# Patient Record
Sex: FEMALE | Race: WHITE | NOT HISPANIC OR LATINO | Employment: OTHER | URBAN - METROPOLITAN AREA
[De-identification: names, ages, dates, MRNs, and addresses within clinical notes are randomized per-mention and may not be internally consistent; named-entity substitution may affect disease eponyms.]

---

## 2022-11-26 ENCOUNTER — HOSPITAL ENCOUNTER (INPATIENT)
Facility: HOSPITAL | Age: 82
LOS: 5 days | Discharge: NURSING, REHAB OR LONG-TERM CARE | End: 2022-12-01
Attending: EMERGENCY MEDICINE | Admitting: INTERNAL MEDICINE

## 2022-11-26 ENCOUNTER — APPOINTMENT (OUTPATIENT)
Dept: CT IMAGING | Facility: HOSPITAL | Age: 82
End: 2022-11-26

## 2022-11-26 ENCOUNTER — APPOINTMENT (OUTPATIENT)
Dept: GENERAL RADIOLOGY | Facility: HOSPITAL | Age: 82
End: 2022-11-26

## 2022-11-26 DIAGNOSIS — I63.9 ISCHEMIC STROKE: ICD-10-CM

## 2022-11-26 DIAGNOSIS — R47.81 SLURRED SPEECH: ICD-10-CM

## 2022-11-26 DIAGNOSIS — I49.5 SICK SINUS SYNDROME: ICD-10-CM

## 2022-11-26 DIAGNOSIS — R53.1 ACUTE LEFT-SIDED WEAKNESS: Primary | ICD-10-CM

## 2022-11-26 DIAGNOSIS — M15.9 PRIMARY OSTEOARTHRITIS INVOLVING MULTIPLE JOINTS: ICD-10-CM

## 2022-11-26 PROBLEM — M62.81 GENERALIZED MUSCLE WEAKNESS: Status: ACTIVE | Noted: 2022-03-18

## 2022-11-26 PROBLEM — I10 ESSENTIAL HYPERTENSION: Status: ACTIVE | Noted: 2022-11-26

## 2022-11-26 PROBLEM — M10.9 GOUT, UNSPECIFIED: Status: ACTIVE | Noted: 2022-03-18

## 2022-11-26 PROBLEM — M19.90 UNSPECIFIED OSTEOARTHRITIS, UNSPECIFIED SITE: Status: ACTIVE | Noted: 2022-03-18

## 2022-11-26 PROBLEM — F32.9 MAJOR DEPRESSIVE DISORDER, SINGLE EPISODE, UNSPECIFIED: Status: ACTIVE | Noted: 2022-03-18

## 2022-11-26 PROBLEM — L03.116 CELLULITIS OF LEFT LOWER LIMB: Status: ACTIVE | Noted: 2022-03-18

## 2022-11-26 PROBLEM — E78.5 HYPERLIPIDEMIA, UNSPECIFIED: Status: ACTIVE | Noted: 2022-03-18

## 2022-11-26 PROBLEM — Z48.812 ENCOUNTER FOR SURGICAL AFTERCARE FOLLOWING SURGERY ON THE CIRCULATORY SYSTEM: Status: ACTIVE | Noted: 2022-03-18

## 2022-11-26 PROBLEM — Z95.0 PRESENCE OF CARDIAC PACEMAKER: Status: ACTIVE | Noted: 2022-03-18

## 2022-11-26 PROBLEM — F41.9 ANXIETY DISORDER, UNSPECIFIED: Status: ACTIVE | Noted: 2022-03-18

## 2022-11-26 PROBLEM — R27.8 OTHER LACK OF COORDINATION: Status: ACTIVE | Noted: 2022-03-18

## 2022-11-26 LAB
ALBUMIN SERPL-MCNC: 4.6 G/DL (ref 3.5–5.2)
ALBUMIN/GLOB SERPL: 1.2 G/DL
ALP SERPL-CCNC: 110 U/L (ref 39–117)
ALT SERPL W P-5'-P-CCNC: 17 U/L (ref 1–33)
ANION GAP SERPL CALCULATED.3IONS-SCNC: 13 MMOL/L (ref 5–15)
APTT PPP: 25.2 SECONDS (ref 24–31)
AST SERPL-CCNC: 24 U/L (ref 1–32)
BACTERIA UR QL AUTO: ABNORMAL /HPF
BASOPHILS # BLD AUTO: 0 10*3/MM3 (ref 0–0.2)
BASOPHILS NFR BLD AUTO: 0.4 % (ref 0–1.5)
BILIRUB SERPL-MCNC: 0.5 MG/DL (ref 0–1.2)
BILIRUB UR QL STRIP: NEGATIVE
BUN SERPL-MCNC: 26 MG/DL (ref 8–23)
BUN/CREAT SERPL: 22 (ref 7–25)
CALCIUM SPEC-SCNC: 10.6 MG/DL (ref 8.6–10.5)
CHLORIDE SERPL-SCNC: 99 MMOL/L (ref 98–107)
CLARITY UR: CLEAR
CO2 SERPL-SCNC: 27 MMOL/L (ref 22–29)
COLOR UR: YELLOW
CREAT SERPL-MCNC: 1.18 MG/DL (ref 0.57–1)
DEPRECATED RDW RBC AUTO: 52.5 FL (ref 37–54)
EGFRCR SERPLBLD CKD-EPI 2021: 46.8 ML/MIN/1.73
EOSINOPHIL # BLD AUTO: 0 10*3/MM3 (ref 0–0.4)
EOSINOPHIL NFR BLD AUTO: 0.1 % (ref 0.3–6.2)
ERYTHROCYTE [DISTWIDTH] IN BLOOD BY AUTOMATED COUNT: 15.5 % (ref 12.3–15.4)
GLOBULIN UR ELPH-MCNC: 3.7 GM/DL
GLUCOSE BLDC GLUCOMTR-MCNC: 149 MG/DL (ref 70–105)
GLUCOSE SERPL-MCNC: 152 MG/DL (ref 65–99)
GLUCOSE UR STRIP-MCNC: NEGATIVE MG/DL
HCT VFR BLD AUTO: 45.1 % (ref 34–46.6)
HGB BLD-MCNC: 14.7 G/DL (ref 12–15.9)
HGB UR QL STRIP.AUTO: NEGATIVE
HOLD SPECIMEN: NORMAL
HOLD SPECIMEN: NORMAL
HYALINE CASTS UR QL AUTO: ABNORMAL /LPF
INR PPP: 1.04 (ref 0.93–1.1)
KETONES UR QL STRIP: NEGATIVE
LEUKOCYTE ESTERASE UR QL STRIP.AUTO: NEGATIVE
LYMPHOCYTES # BLD AUTO: 0.9 10*3/MM3 (ref 0.7–3.1)
LYMPHOCYTES NFR BLD AUTO: 8.2 % (ref 19.6–45.3)
MCH RBC QN AUTO: 31.8 PG (ref 26.6–33)
MCHC RBC AUTO-ENTMCNC: 32.5 G/DL (ref 31.5–35.7)
MCV RBC AUTO: 97.8 FL (ref 79–97)
MONOCYTES # BLD AUTO: 1 10*3/MM3 (ref 0.1–0.9)
MONOCYTES NFR BLD AUTO: 9.4 % (ref 5–12)
NEUTROPHILS NFR BLD AUTO: 8.9 10*3/MM3 (ref 1.7–7)
NEUTROPHILS NFR BLD AUTO: 81.9 % (ref 42.7–76)
NITRITE UR QL STRIP: NEGATIVE
NRBC BLD AUTO-RTO: 0 /100 WBC (ref 0–0.2)
PH UR STRIP.AUTO: <=5 [PH] (ref 5–8)
PLATELET # BLD AUTO: 194 10*3/MM3 (ref 140–450)
PMV BLD AUTO: 9.6 FL (ref 6–12)
POTASSIUM SERPL-SCNC: 4.2 MMOL/L (ref 3.5–5.2)
PROT SERPL-MCNC: 8.3 G/DL (ref 6–8.5)
PROT UR QL STRIP: ABNORMAL
PROTHROMBIN TIME: 10.7 SECONDS (ref 9.6–11.7)
RBC # BLD AUTO: 4.61 10*6/MM3 (ref 3.77–5.28)
RBC # UR STRIP: ABNORMAL /HPF
REF LAB TEST METHOD: ABNORMAL
SODIUM SERPL-SCNC: 139 MMOL/L (ref 136–145)
SP GR UR STRIP: 1.04 (ref 1–1.03)
SQUAMOUS #/AREA URNS HPF: ABNORMAL /HPF
TROPONIN T SERPL-MCNC: <0.01 NG/ML (ref 0–0.03)
UROBILINOGEN UR QL STRIP: ABNORMAL
WBC # UR STRIP: ABNORMAL /HPF
WBC NRBC COR # BLD: 10.9 10*3/MM3 (ref 3.4–10.8)
WHOLE BLOOD HOLD COAG: NORMAL
WHOLE BLOOD HOLD SPECIMEN: NORMAL

## 2022-11-26 PROCEDURE — 70450 CT HEAD/BRAIN W/O DYE: CPT

## 2022-11-26 PROCEDURE — 86900 BLOOD TYPING SEROLOGIC ABO: CPT | Performed by: EMERGENCY MEDICINE

## 2022-11-26 PROCEDURE — 99285 EMERGENCY DEPT VISIT HI MDM: CPT

## 2022-11-26 PROCEDURE — 70496 CT ANGIOGRAPHY HEAD: CPT

## 2022-11-26 PROCEDURE — 25010000002 ALTEPLASE PER 1 MG: Performed by: EMERGENCY MEDICINE

## 2022-11-26 PROCEDURE — 86901 BLOOD TYPING SEROLOGIC RH(D): CPT | Performed by: EMERGENCY MEDICINE

## 2022-11-26 PROCEDURE — 25010000002 ALTEPLASE PER 1 MG

## 2022-11-26 PROCEDURE — 81001 URINALYSIS AUTO W/SCOPE: CPT | Performed by: EMERGENCY MEDICINE

## 2022-11-26 PROCEDURE — 81003 URINALYSIS AUTO W/O SCOPE: CPT | Performed by: EMERGENCY MEDICINE

## 2022-11-26 PROCEDURE — 85730 THROMBOPLASTIN TIME PARTIAL: CPT | Performed by: EMERGENCY MEDICINE

## 2022-11-26 PROCEDURE — 86850 RBC ANTIBODY SCREEN: CPT | Performed by: EMERGENCY MEDICINE

## 2022-11-26 PROCEDURE — P9612 CATHETERIZE FOR URINE SPEC: HCPCS

## 2022-11-26 PROCEDURE — 71045 X-RAY EXAM CHEST 1 VIEW: CPT

## 2022-11-26 PROCEDURE — 74176 CT ABD & PELVIS W/O CONTRAST: CPT

## 2022-11-26 PROCEDURE — 85610 PROTHROMBIN TIME: CPT | Performed by: EMERGENCY MEDICINE

## 2022-11-26 PROCEDURE — 0042T HC CT CEREBRAL PERFUSION W/WO CONTRAST: CPT

## 2022-11-26 PROCEDURE — 0 IOPAMIDOL PER 1 ML: Performed by: EMERGENCY MEDICINE

## 2022-11-26 PROCEDURE — 3E03317 INTRODUCTION OF OTHER THROMBOLYTIC INTO PERIPHERAL VEIN, PERCUTANEOUS APPROACH: ICD-10-PCS | Performed by: EMERGENCY MEDICINE

## 2022-11-26 PROCEDURE — 84484 ASSAY OF TROPONIN QUANT: CPT | Performed by: EMERGENCY MEDICINE

## 2022-11-26 PROCEDURE — 36415 COLL VENOUS BLD VENIPUNCTURE: CPT | Performed by: EMERGENCY MEDICINE

## 2022-11-26 PROCEDURE — 25010000002 ONDANSETRON PER 1 MG: Performed by: EMERGENCY MEDICINE

## 2022-11-26 PROCEDURE — 93005 ELECTROCARDIOGRAM TRACING: CPT | Performed by: EMERGENCY MEDICINE

## 2022-11-26 PROCEDURE — 85025 COMPLETE CBC W/AUTO DIFF WBC: CPT | Performed by: EMERGENCY MEDICINE

## 2022-11-26 PROCEDURE — 70498 CT ANGIOGRAPHY NECK: CPT

## 2022-11-26 PROCEDURE — 82962 GLUCOSE BLOOD TEST: CPT

## 2022-11-26 PROCEDURE — 80053 COMPREHEN METABOLIC PANEL: CPT | Performed by: EMERGENCY MEDICINE

## 2022-11-26 RX ORDER — ONDANSETRON 2 MG/ML
8 INJECTION INTRAMUSCULAR; INTRAVENOUS ONCE
Status: COMPLETED | OUTPATIENT
Start: 2022-11-26 | End: 2022-11-26

## 2022-11-26 RX ORDER — SODIUM CHLORIDE 0.9 % (FLUSH) 0.9 %
10 SYRINGE (ML) INJECTION EVERY 12 HOURS SCHEDULED
Status: DISCONTINUED | OUTPATIENT
Start: 2022-11-26 | End: 2022-12-01 | Stop reason: HOSPADM

## 2022-11-26 RX ORDER — SODIUM CHLORIDE 9 MG/ML
0.81 INJECTION, SOLUTION INTRAVENOUS ONCE
Status: COMPLETED | OUTPATIENT
Start: 2022-11-26 | End: 2022-11-26

## 2022-11-26 RX ORDER — HYDRALAZINE HYDROCHLORIDE 20 MG/ML
10 INJECTION INTRAMUSCULAR; INTRAVENOUS EVERY 6 HOURS PRN
Status: DISCONTINUED | OUTPATIENT
Start: 2022-11-26 | End: 2022-12-01 | Stop reason: HOSPADM

## 2022-11-26 RX ORDER — SODIUM CHLORIDE 0.9 % (FLUSH) 0.9 %
10 SYRINGE (ML) INJECTION AS NEEDED
Status: DISCONTINUED | OUTPATIENT
Start: 2022-11-26 | End: 2022-12-01 | Stop reason: HOSPADM

## 2022-11-26 RX ORDER — SODIUM CHLORIDE 9 MG/ML
75 INJECTION, SOLUTION INTRAVENOUS CONTINUOUS
Status: DISCONTINUED | OUTPATIENT
Start: 2022-11-27 | End: 2022-11-28

## 2022-11-26 RX ORDER — ATORVASTATIN CALCIUM 40 MG/1
80 TABLET, FILM COATED ORAL NIGHTLY
Status: DISCONTINUED | OUTPATIENT
Start: 2022-11-26 | End: 2022-12-01 | Stop reason: HOSPADM

## 2022-11-26 RX ORDER — ASPIRIN 325 MG
325 TABLET ORAL DAILY
Status: DISCONTINUED | OUTPATIENT
Start: 2022-11-27 | End: 2022-12-01 | Stop reason: HOSPADM

## 2022-11-26 RX ORDER — SODIUM CHLORIDE 9 MG/ML
40 INJECTION, SOLUTION INTRAVENOUS AS NEEDED
Status: DISCONTINUED | OUTPATIENT
Start: 2022-11-26 | End: 2022-12-01 | Stop reason: HOSPADM

## 2022-11-26 RX ORDER — ASPIRIN 300 MG/1
300 SUPPOSITORY RECTAL DAILY
Status: DISCONTINUED | OUTPATIENT
Start: 2022-11-27 | End: 2022-12-01 | Stop reason: HOSPADM

## 2022-11-26 RX ADMIN — IOPAMIDOL 150 ML: 755 INJECTION, SOLUTION INTRAVENOUS at 20:55

## 2022-11-26 RX ADMIN — ALTEPLASE 51.8 MG: KIT at 21:04

## 2022-11-26 RX ADMIN — Medication 10 ML: at 23:48

## 2022-11-26 RX ADMIN — SODIUM CHLORIDE 500 ML: 9 INJECTION, SOLUTION INTRAVENOUS at 22:15

## 2022-11-26 RX ADMIN — SODIUM CHLORIDE 0.81 ML/KG/HR: 9 INJECTION, SOLUTION INTRAVENOUS at 21:49

## 2022-11-26 RX ADMIN — ONDANSETRON 8 MG: 2 INJECTION INTRAMUSCULAR; INTRAVENOUS at 22:05

## 2022-11-26 RX ADMIN — ALTEPLASE 5.8 MG: KIT at 21:01

## 2022-11-27 ENCOUNTER — APPOINTMENT (OUTPATIENT)
Dept: CT IMAGING | Facility: HOSPITAL | Age: 82
End: 2022-11-27

## 2022-11-27 LAB
CHOLEST SERPL-MCNC: 200 MG/DL (ref 0–200)
GLUCOSE BLDC GLUCOMTR-MCNC: 108 MG/DL (ref 70–105)
GLUCOSE BLDC GLUCOMTR-MCNC: 110 MG/DL (ref 70–105)
GLUCOSE BLDC GLUCOMTR-MCNC: 168 MG/DL (ref 70–105)
HDLC SERPL-MCNC: 51 MG/DL (ref 40–60)
LDLC SERPL CALC-MCNC: 133 MG/DL (ref 0–100)
LDLC/HDLC SERPL: 2.57 {RATIO}
T4 FREE SERPL-MCNC: 1.2 NG/DL (ref 0.93–1.7)
TRIGL SERPL-MCNC: 89 MG/DL (ref 0–150)
TSH SERPL DL<=0.05 MIU/L-ACNC: 2.85 UIU/ML (ref 0.27–4.2)
VLDLC SERPL-MCNC: 16 MG/DL (ref 5–40)

## 2022-11-27 PROCEDURE — 83036 HEMOGLOBIN GLYCOSYLATED A1C: CPT | Performed by: PSYCHIATRY & NEUROLOGY

## 2022-11-27 PROCEDURE — 99222 1ST HOSP IP/OBS MODERATE 55: CPT | Performed by: PSYCHIATRY & NEUROLOGY

## 2022-11-27 PROCEDURE — 84443 ASSAY THYROID STIM HORMONE: CPT | Performed by: PSYCHIATRY & NEUROLOGY

## 2022-11-27 PROCEDURE — 84439 ASSAY OF FREE THYROXINE: CPT | Performed by: PSYCHIATRY & NEUROLOGY

## 2022-11-27 PROCEDURE — 92610 EVALUATE SWALLOWING FUNCTION: CPT

## 2022-11-27 PROCEDURE — 82962 GLUCOSE BLOOD TEST: CPT

## 2022-11-27 PROCEDURE — 80061 LIPID PANEL: CPT | Performed by: PSYCHIATRY & NEUROLOGY

## 2022-11-27 PROCEDURE — 70450 CT HEAD/BRAIN W/O DYE: CPT

## 2022-11-27 PROCEDURE — 25010000002 HYDRALAZINE PER 20 MG: Performed by: NURSE PRACTITIONER

## 2022-11-27 RX ADMIN — Medication 10 ML: at 20:39

## 2022-11-27 RX ADMIN — ATORVASTATIN CALCIUM 80 MG: 40 TABLET, FILM COATED ORAL at 20:38

## 2022-11-27 RX ADMIN — Medication 10 ML: at 08:22

## 2022-11-27 RX ADMIN — SODIUM CHLORIDE 75 ML/HR: 9 INJECTION, SOLUTION INTRAVENOUS at 00:30

## 2022-11-27 RX ADMIN — SODIUM CHLORIDE 5 MG/HR: 9 INJECTION, SOLUTION INTRAVENOUS at 04:28

## 2022-11-27 RX ADMIN — HYDRALAZINE HYDROCHLORIDE 10 MG: 20 INJECTION INTRAMUSCULAR; INTRAVENOUS at 03:19

## 2022-11-27 RX ADMIN — ASPIRIN 325 MG ORAL TABLET 325 MG: 325 PILL ORAL at 22:01

## 2022-11-27 RX ADMIN — SODIUM CHLORIDE 75 ML/HR: 9 INJECTION, SOLUTION INTRAVENOUS at 17:45

## 2022-11-27 RX ADMIN — SODIUM CHLORIDE 5 MG/HR: 9 INJECTION, SOLUTION INTRAVENOUS at 20:38

## 2022-11-28 ENCOUNTER — APPOINTMENT (OUTPATIENT)
Dept: MRI IMAGING | Facility: HOSPITAL | Age: 82
End: 2022-11-28

## 2022-11-28 ENCOUNTER — APPOINTMENT (OUTPATIENT)
Dept: CARDIOLOGY | Facility: HOSPITAL | Age: 82
End: 2022-11-28

## 2022-11-28 LAB
ANION GAP SERPL CALCULATED.3IONS-SCNC: 12 MMOL/L (ref 5–15)
BASOPHILS # BLD AUTO: 0.1 10*3/MM3 (ref 0–0.2)
BASOPHILS NFR BLD AUTO: 0.4 % (ref 0–1.5)
BUN SERPL-MCNC: 27 MG/DL (ref 8–23)
BUN/CREAT SERPL: 23.9 (ref 7–25)
CALCIUM SPEC-SCNC: 9.1 MG/DL (ref 8.6–10.5)
CHLORIDE SERPL-SCNC: 109 MMOL/L (ref 98–107)
CO2 SERPL-SCNC: 24 MMOL/L (ref 22–29)
CREAT SERPL-MCNC: 1.13 MG/DL (ref 0.57–1)
DEPRECATED RDW RBC AUTO: 51.2 FL (ref 37–54)
EGFRCR SERPLBLD CKD-EPI 2021: 49.3 ML/MIN/1.73
EOSINOPHIL # BLD AUTO: 0 10*3/MM3 (ref 0–0.4)
EOSINOPHIL NFR BLD AUTO: 0.2 % (ref 0.3–6.2)
ERYTHROCYTE [DISTWIDTH] IN BLOOD BY AUTOMATED COUNT: 15.2 % (ref 12.3–15.4)
GLUCOSE SERPL-MCNC: 99 MG/DL (ref 65–99)
HBA1C MFR BLD: 5.3 % (ref 3.5–5.6)
HCT VFR BLD AUTO: 42.1 % (ref 34–46.6)
HGB BLD-MCNC: 13.4 G/DL (ref 12–15.9)
LYMPHOCYTES # BLD AUTO: 2.2 10*3/MM3 (ref 0.7–3.1)
LYMPHOCYTES NFR BLD AUTO: 17.2 % (ref 19.6–45.3)
MCH RBC QN AUTO: 31.6 PG (ref 26.6–33)
MCHC RBC AUTO-ENTMCNC: 31.9 G/DL (ref 31.5–35.7)
MCV RBC AUTO: 99.1 FL (ref 79–97)
MONOCYTES # BLD AUTO: 1.2 10*3/MM3 (ref 0.1–0.9)
MONOCYTES NFR BLD AUTO: 9.5 % (ref 5–12)
NEUTROPHILS NFR BLD AUTO: 72.7 % (ref 42.7–76)
NEUTROPHILS NFR BLD AUTO: 9.4 10*3/MM3 (ref 1.7–7)
NRBC BLD AUTO-RTO: 0.1 /100 WBC (ref 0–0.2)
PLATELET # BLD AUTO: 175 10*3/MM3 (ref 140–450)
PMV BLD AUTO: 9.1 FL (ref 6–12)
POTASSIUM SERPL-SCNC: 4.2 MMOL/L (ref 3.5–5.2)
RBC # BLD AUTO: 4.25 10*6/MM3 (ref 3.77–5.28)
SODIUM SERPL-SCNC: 145 MMOL/L (ref 136–145)
WBC NRBC COR # BLD: 13 10*3/MM3 (ref 3.4–10.8)

## 2022-11-28 PROCEDURE — 93306 TTE W/DOPPLER COMPLETE: CPT | Performed by: INTERNAL MEDICINE

## 2022-11-28 PROCEDURE — 25010000002 SULFUR HEXAFLUORIDE MICROSPH 60.7-25 MG RECONSTITUTED SUSPENSION: Performed by: INTERNAL MEDICINE

## 2022-11-28 PROCEDURE — 93306 TTE W/DOPPLER COMPLETE: CPT

## 2022-11-28 PROCEDURE — 80048 BASIC METABOLIC PNL TOTAL CA: CPT | Performed by: NURSE PRACTITIONER

## 2022-11-28 PROCEDURE — 97162 PT EVAL MOD COMPLEX 30 MIN: CPT

## 2022-11-28 PROCEDURE — 85025 COMPLETE CBC W/AUTO DIFF WBC: CPT | Performed by: NURSE PRACTITIONER

## 2022-11-28 PROCEDURE — 81403 MOPATH PROCEDURE LEVEL 4: CPT

## 2022-11-28 PROCEDURE — 97166 OT EVAL MOD COMPLEX 45 MIN: CPT | Performed by: OCCUPATIONAL THERAPIST

## 2022-11-28 PROCEDURE — 97535 SELF CARE MNGMENT TRAINING: CPT | Performed by: OCCUPATIONAL THERAPIST

## 2022-11-28 PROCEDURE — 97116 GAIT TRAINING THERAPY: CPT

## 2022-11-28 RX ADMIN — ATORVASTATIN CALCIUM 80 MG: 40 TABLET, FILM COATED ORAL at 20:21

## 2022-11-28 RX ADMIN — SULFUR HEXAFLUORIDE 5 ML: KIT at 12:15

## 2022-11-28 RX ADMIN — SODIUM CHLORIDE 75 ML/HR: 9 INJECTION, SOLUTION INTRAVENOUS at 04:05

## 2022-11-28 RX ADMIN — Medication 10 ML: at 08:25

## 2022-11-28 RX ADMIN — Medication 10 ML: at 20:21

## 2022-11-29 ENCOUNTER — APPOINTMENT (OUTPATIENT)
Dept: MRI IMAGING | Facility: HOSPITAL | Age: 82
End: 2022-11-29

## 2022-11-29 PROBLEM — R53.81 PHYSICAL DEBILITY: Status: ACTIVE | Noted: 2022-11-29

## 2022-11-29 PROBLEM — E78.2 MIXED HYPERLIPIDEMIA: Status: ACTIVE | Noted: 2022-03-18

## 2022-11-29 PROBLEM — N17.9 ACUTE KIDNEY INJURY: Status: ACTIVE | Noted: 2022-11-29

## 2022-11-29 PROBLEM — I48.91 ATRIAL FIBRILLATION: Status: ACTIVE | Noted: 2022-11-29

## 2022-11-29 LAB
BH CV ECHO MEAS - ACS: 1.63 CM
BH CV ECHO MEAS - AO MAX PG: 8.4 MMHG
BH CV ECHO MEAS - AO MEAN PG: 4.4 MMHG
BH CV ECHO MEAS - AO ROOT DIAM: 2.8 CM
BH CV ECHO MEAS - AO V2 MAX: 144.8 CM/SEC
BH CV ECHO MEAS - AO V2 VTI: 35.4 CM
BH CV ECHO MEAS - AVA(I,D): 1.66 CM2
BH CV ECHO MEAS - EDV(CUBED): 69.3 ML
BH CV ECHO MEAS - EDV(MOD-SP4): 78.9 ML
BH CV ECHO MEAS - EF(MOD-SP4): 61 %
BH CV ECHO MEAS - ESV(CUBED): 22.6 ML
BH CV ECHO MEAS - ESV(MOD-SP4): 30.8 ML
BH CV ECHO MEAS - FS: 31.2 %
BH CV ECHO MEAS - IVS/LVPW: 1.19 CM
BH CV ECHO MEAS - IVSD: 1.01 CM
BH CV ECHO MEAS - LA DIMENSION: 3.1 CM
BH CV ECHO MEAS - LV DIASTOLIC VOL/BSA (35-75): 49.1 CM2
BH CV ECHO MEAS - LV MASS(C)D: 118.7 GRAMS
BH CV ECHO MEAS - LV MAX PG: 5.4 MMHG
BH CV ECHO MEAS - LV MEAN PG: 2.6 MMHG
BH CV ECHO MEAS - LV SYSTOLIC VOL/BSA (12-30): 19.2 CM2
BH CV ECHO MEAS - LV V1 MAX: 115.8 CM/SEC
BH CV ECHO MEAS - LV V1 VTI: 20.8 CM
BH CV ECHO MEAS - LVIDD: 4.1 CM
BH CV ECHO MEAS - LVIDS: 2.8 CM
BH CV ECHO MEAS - LVOT AREA: 2.8 CM2
BH CV ECHO MEAS - LVOT DIAM: 1.89 CM
BH CV ECHO MEAS - LVPWD: 0.84 CM
BH CV ECHO MEAS - MV A MAX VEL: 94.4 CM/SEC
BH CV ECHO MEAS - MV DEC SLOPE: 193 CM/SEC2
BH CV ECHO MEAS - MV DEC TIME: 0.31 MSEC
BH CV ECHO MEAS - MV E MAX VEL: 58.9 CM/SEC
BH CV ECHO MEAS - MV E/A: 0.62
BH CV ECHO MEAS - MV MAX PG: 4.4 MMHG
BH CV ECHO MEAS - MV MEAN PG: 1.26 MMHG
BH CV ECHO MEAS - MV V2 VTI: 24.2 CM
BH CV ECHO MEAS - MVA(VTI): 2.42 CM2
BH CV ECHO MEAS - PA ACC TIME: 0.08 SEC
BH CV ECHO MEAS - PA PR(ACCEL): 44.7 MMHG
BH CV ECHO MEAS - PA V2 MAX: 110.4 CM/SEC
BH CV ECHO MEAS - RAP SYSTOLE: 3 MMHG
BH CV ECHO MEAS - RV MAX PG: 2.18 MMHG
BH CV ECHO MEAS - RV V1 MAX: 73.8 CM/SEC
BH CV ECHO MEAS - RV V1 VTI: 12.5 CM
BH CV ECHO MEAS - RVDD: 2.3 CM
BH CV ECHO MEAS - RVSP: 36.1 MMHG
BH CV ECHO MEAS - SI(MOD-SP4): 29.9 ML/M2
BH CV ECHO MEAS - SV(LVOT): 58.7 ML
BH CV ECHO MEAS - SV(MOD-SP4): 48.1 ML
BH CV ECHO MEAS - TR MAX PG: 33.1 MMHG
BH CV ECHO MEAS - TR MAX VEL: 287.4 CM/SEC
BH CV ECHO SHUNT ASSESSMENT PERFORMED (HIDDEN SCRIPTING): 1
MAXIMAL PREDICTED HEART RATE: 140 BPM
STRESS TARGET HR: 119 BPM

## 2022-11-29 PROCEDURE — 70551 MRI BRAIN STEM W/O DYE: CPT

## 2022-11-29 PROCEDURE — 97530 THERAPEUTIC ACTIVITIES: CPT

## 2022-11-29 PROCEDURE — 97116 GAIT TRAINING THERAPY: CPT

## 2022-11-29 PROCEDURE — 92526 ORAL FUNCTION THERAPY: CPT

## 2022-11-29 RX ADMIN — Medication 10 ML: at 21:47

## 2022-11-29 RX ADMIN — ASPIRIN 325 MG ORAL TABLET 325 MG: 325 PILL ORAL at 08:04

## 2022-11-29 RX ADMIN — Medication 10 ML: at 08:04

## 2022-11-29 RX ADMIN — ATORVASTATIN CALCIUM 80 MG: 40 TABLET, FILM COATED ORAL at 21:46

## 2022-11-30 ENCOUNTER — PREP FOR SURGERY (OUTPATIENT)
Dept: OTHER | Facility: HOSPITAL | Age: 82
End: 2022-11-30

## 2022-11-30 ENCOUNTER — APPOINTMENT (OUTPATIENT)
Dept: GENERAL RADIOLOGY | Facility: HOSPITAL | Age: 82
End: 2022-11-30

## 2022-11-30 DIAGNOSIS — I49.5 SICK SINUS SYNDROME: Primary | ICD-10-CM

## 2022-11-30 PROCEDURE — 99153 MOD SED SAME PHYS/QHP EA: CPT | Performed by: INTERNAL MEDICINE

## 2022-11-30 PROCEDURE — 0JH606Z INSERTION OF PACEMAKER, DUAL CHAMBER INTO CHEST SUBCUTANEOUS TISSUE AND FASCIA, OPEN APPROACH: ICD-10-PCS | Performed by: INTERNAL MEDICINE

## 2022-11-30 PROCEDURE — C1898 LEAD, PMKR, OTHER THAN TRANS: HCPCS | Performed by: INTERNAL MEDICINE

## 2022-11-30 PROCEDURE — 0 IOPAMIDOL PER 1 ML: Performed by: INTERNAL MEDICINE

## 2022-11-30 PROCEDURE — C1889 IMPLANT/INSERT DEVICE, NOC: HCPCS | Performed by: INTERNAL MEDICINE

## 2022-11-30 PROCEDURE — 25010000002 MIDAZOLAM PER 1 MG: Performed by: INTERNAL MEDICINE

## 2022-11-30 PROCEDURE — 99222 1ST HOSP IP/OBS MODERATE 55: CPT | Performed by: INTERNAL MEDICINE

## 2022-11-30 PROCEDURE — 99152 MOD SED SAME PHYS/QHP 5/>YRS: CPT | Performed by: INTERNAL MEDICINE

## 2022-11-30 PROCEDURE — 02H63JZ INSERTION OF PACEMAKER LEAD INTO RIGHT ATRIUM, PERCUTANEOUS APPROACH: ICD-10-PCS | Performed by: INTERNAL MEDICINE

## 2022-11-30 PROCEDURE — 0JPT0PZ REMOVAL OF CARDIAC RHYTHM RELATED DEVICE FROM TRUNK SUBCUTANEOUS TISSUE AND FASCIA, OPEN APPROACH: ICD-10-PCS | Performed by: INTERNAL MEDICINE

## 2022-11-30 PROCEDURE — C1894 INTRO/SHEATH, NON-LASER: HCPCS | Performed by: INTERNAL MEDICINE

## 2022-11-30 PROCEDURE — 71045 X-RAY EXAM CHEST 1 VIEW: CPT

## 2022-11-30 PROCEDURE — 25010000002 FENTANYL CITRATE (PF) 50 MCG/ML SOLUTION: Performed by: INTERNAL MEDICINE

## 2022-11-30 PROCEDURE — 33214 UPGRADE OF PACEMAKER SYSTEM: CPT | Performed by: INTERNAL MEDICINE

## 2022-11-30 PROCEDURE — 25010000002 FENTANYL CITRATE (PF) 100 MCG/2ML SOLUTION: Performed by: INTERNAL MEDICINE

## 2022-11-30 PROCEDURE — 25010000002 CEFAZOLIN PER 500 MG: Performed by: INTERNAL MEDICINE

## 2022-11-30 PROCEDURE — C1785 PMKR, DUAL, RATE-RESP: HCPCS | Performed by: INTERNAL MEDICINE

## 2022-11-30 DEVICE — LD PM SOLIA S 45: Type: IMPLANTABLE DEVICE | Site: CHEST | Status: FUNCTIONAL

## 2022-11-30 DEVICE — IMPLANTABLE DEVICE
Type: IMPLANTABLE DEVICE | Site: CHEST | Status: FUNCTIONAL
Brand: EDORA 8 DR-T

## 2022-11-30 DEVICE — ENV PM AIGISRX ANTIBAC RESORB 2.5X2.7IN MD: Type: IMPLANTABLE DEVICE | Site: CHEST | Status: FUNCTIONAL

## 2022-11-30 RX ORDER — FENTANYL CITRATE 50 UG/ML
INJECTION, SOLUTION INTRAMUSCULAR; INTRAVENOUS
Status: DISCONTINUED | OUTPATIENT
Start: 2022-11-30 | End: 2022-11-30 | Stop reason: HOSPADM

## 2022-11-30 RX ORDER — HYDROCODONE BITARTRATE AND ACETAMINOPHEN 5; 325 MG/1; MG/1
1 TABLET ORAL EVERY 4 HOURS PRN
Status: DISCONTINUED | OUTPATIENT
Start: 2022-11-30 | End: 2022-12-01 | Stop reason: HOSPADM

## 2022-11-30 RX ORDER — MIDAZOLAM HYDROCHLORIDE 1 MG/ML
INJECTION INTRAMUSCULAR; INTRAVENOUS
Status: DISCONTINUED | OUTPATIENT
Start: 2022-11-30 | End: 2022-12-01 | Stop reason: HOSPADM

## 2022-11-30 RX ORDER — MIDAZOLAM HYDROCHLORIDE 1 MG/ML
INJECTION INTRAMUSCULAR; INTRAVENOUS
Status: DISCONTINUED | OUTPATIENT
Start: 2022-11-30 | End: 2022-11-30 | Stop reason: HOSPADM

## 2022-11-30 RX ORDER — LIDOCAINE HYDROCHLORIDE AND EPINEPHRINE BITARTRATE 20; .01 MG/ML; MG/ML
INJECTION, SOLUTION SUBCUTANEOUS
Status: DISCONTINUED | OUTPATIENT
Start: 2022-11-30 | End: 2022-11-30 | Stop reason: HOSPADM

## 2022-11-30 RX ADMIN — CEFAZOLIN 2 G: 2 INJECTION, POWDER, FOR SOLUTION INTRAMUSCULAR; INTRAVENOUS at 14:59

## 2022-11-30 RX ADMIN — HYDROCODONE BITARTRATE AND ACETAMINOPHEN 1 TABLET: 5; 325 TABLET ORAL at 22:35

## 2022-11-30 RX ADMIN — Medication 10 ML: at 20:28

## 2022-11-30 RX ADMIN — ATORVASTATIN CALCIUM 80 MG: 40 TABLET, FILM COATED ORAL at 20:28

## 2022-11-30 RX ADMIN — ASPIRIN 325 MG ORAL TABLET 325 MG: 325 PILL ORAL at 08:00

## 2022-11-30 RX ADMIN — Medication 10 ML: at 08:00

## 2022-11-30 RX ADMIN — CEFAZOLIN 2 G: 2 INJECTION, POWDER, FOR SOLUTION INTRAMUSCULAR; INTRAVENOUS at 22:35

## 2022-12-01 ENCOUNTER — READMISSION MANAGEMENT (OUTPATIENT)
Dept: CALL CENTER | Facility: HOSPITAL | Age: 82
End: 2022-12-01

## 2022-12-01 VITALS
WEIGHT: 149.91 LBS | OXYGEN SATURATION: 94 % | RESPIRATION RATE: 20 BRPM | HEART RATE: 60 BPM | SYSTOLIC BLOOD PRESSURE: 155 MMHG | HEIGHT: 62 IN | DIASTOLIC BLOOD PRESSURE: 69 MMHG | TEMPERATURE: 98 F | BODY MASS INDEX: 27.59 KG/M2

## 2022-12-01 PROBLEM — G45.9 TRANSIENT ISCHEMIC ATTACK (TIA): Status: ACTIVE | Noted: 2022-12-01

## 2022-12-01 PROBLEM — I47.1 NARROW COMPLEX TACHYCARDIA: Status: ACTIVE | Noted: 2022-12-01

## 2022-12-01 PROBLEM — I47.19 NARROW COMPLEX TACHYCARDIA: Status: ACTIVE | Noted: 2022-12-01

## 2022-12-01 LAB
ABO GROUP BLD: NORMAL
BLD GP AB SCN SERPL QL: NEGATIVE
RH BLD: POSITIVE
T&S EXPIRATION DATE: NORMAL

## 2022-12-01 PROCEDURE — 97168 OT RE-EVAL EST PLAN CARE: CPT

## 2022-12-01 PROCEDURE — 92526 ORAL FUNCTION THERAPY: CPT

## 2022-12-01 PROCEDURE — 86901 BLOOD TYPING SEROLOGIC RH(D): CPT

## 2022-12-01 PROCEDURE — 93005 ELECTROCARDIOGRAM TRACING: CPT | Performed by: INTERNAL MEDICINE

## 2022-12-01 PROCEDURE — 93010 ELECTROCARDIOGRAM REPORT: CPT | Performed by: INTERNAL MEDICINE

## 2022-12-01 PROCEDURE — 99024 POSTOP FOLLOW-UP VISIT: CPT | Performed by: INTERNAL MEDICINE

## 2022-12-01 PROCEDURE — 97535 SELF CARE MNGMENT TRAINING: CPT

## 2022-12-01 PROCEDURE — 97164 PT RE-EVAL EST PLAN CARE: CPT

## 2022-12-01 PROCEDURE — 25010000002 CEFAZOLIN PER 500 MG: Performed by: INTERNAL MEDICINE

## 2022-12-01 PROCEDURE — 86900 BLOOD TYPING SEROLOGIC ABO: CPT

## 2022-12-01 PROCEDURE — 97116 GAIT TRAINING THERAPY: CPT

## 2022-12-01 RX ORDER — OXYCODONE HYDROCHLORIDE 5 MG/1
5 TABLET ORAL EVERY 8 HOURS PRN
Qty: 12 TABLET | Refills: 0 | Status: SHIPPED | OUTPATIENT
Start: 2022-12-01 | End: 2022-12-05

## 2022-12-01 RX ORDER — ATORVASTATIN CALCIUM 80 MG/1
80 TABLET, FILM COATED ORAL NIGHTLY
Qty: 30 TABLET | Refills: 2 | Status: SHIPPED | OUTPATIENT
Start: 2022-12-01 | End: 2023-03-01

## 2022-12-01 RX ORDER — ASPIRIN 81 MG/1
81 TABLET ORAL DAILY
Qty: 30 TABLET | Refills: 2 | Status: SHIPPED | OUTPATIENT
Start: 2022-12-01 | End: 2023-03-01

## 2022-12-01 RX ADMIN — Medication 10 ML: at 08:33

## 2022-12-01 RX ADMIN — ASPIRIN 325 MG ORAL TABLET 325 MG: 325 PILL ORAL at 08:33

## 2022-12-01 RX ADMIN — CEFAZOLIN 2 G: 2 INJECTION, POWDER, FOR SOLUTION INTRAMUSCULAR; INTRAVENOUS at 08:32

## 2022-12-01 NOTE — NURSING NOTE
Report called to [facility], spoke with Tracy and report given. Left unit per w/c to meet   in [address]. IV removed w/o difficuty or bleeding.

## 2022-12-01 NOTE — DISCHARGE SUMMARY
[facility] Medicine Services  DISCHARGE SUMMARY    Patient Name: Stephanie Sol  : 1940  MRN: [id number]    Date of Admission: 2022  Discharge Diagnosis: TIA/CRISTIAN/narrow complex tachycardia.  Date of Discharge:   Primary Care Physician: Lior Oscar MD      Presenting Problem:   Slurred speech [R47.81]  Acute left-sided weakness [R53.1]  Ischemic stroke (HCC) [I63.9]    Active and Resolved Hospital Problems:  Active Hospital Problems    Diagnosis POA   • **Acute left-sided weakness [R53.1] Yes     Priority: High   • Narrow complex tachycardia (HCC) [I47.1] Yes     Priority: High   • Acute kidney injury (HCC) [N17.9] Yes     Priority: High   • Physical debility [R53.81] Yes     Priority: Low   • Primary hypertension [I10] Yes   • Anxiety disorder, unspecified [F41.9] Yes   • Gout, unspecified [M10.9] Yes   • Mixed hyperlipidemia [E78.2] Yes   • Major depressive disorder, single episode, unspecified [F32.9] Yes   • Presence of cardiac pacemaker [Z95.0] Yes   • Generalized muscle weakness [M62.81] Yes   • Sick sinus syndrome (HCC) [I49.5] Yes      Resolved Hospital Problems   No resolved problems to display.         Hospital Course   From previous notes and with minor updates.      Hospital Course:    Patient is an 82 y.o. female who presented to the emergency department on 2022 with complaints of sudden left-sided weakness.  Neurology was consulted emergently and she was deemed appropriate for tPA intervention.  She was then taken to the ICU for close monitoring.  CT head, CTA head and neck and CT perfusion study per radiology showed no acute findings.  She passed her dysphagia screening and is tolerating p.o. meds.  She is awake and alert and answers appropriately.  No slurred speech, facial droop.  NIH 0.  She reports she is on some medication for gout and hypertension but is unaware of the meds she is on.  She has no current complaints.  Per review of  records apparently she had some new onset atrial fibrillation upon admission.  Her rhythm currently is sinus rhythm.  Creatinine was initially 1.18 with a baseline.  Labs have been reordered in the a.m.  She has a past medical history of sick sinus syndrome with a pacemaker in situ.  She denies any chest pain or shortness of air.  She is currently stable post tPA administration and has been downgraded from critical care status.  We have been consulted for medical management.  Patient was diagnosed with a narrow complex tachycardia and not atrial fibrillation.  Cardiology consult was completed.  MRI did not show any evidence of intracranial process.  Neurology consult was completed.  Patient possibly had a TIA.    TIA was treated with aspirin.  Acute kidney injury was treated with IV fluids.  Hyperlipidemia was treated with Lipitor.  Physical debility was treated with physical therapy.  Patient reported complete resolution of her symptoms after over 5 days in the hospital and requested to be discharged home.  Patient was advised to take her medications as prescribed.  The discharge medications are as per medication reconciliation list.  Patient was advised to follow-up with her primary care physician within 3 to 5 days of discharge.  Patient was advised to follow-up with her cardiologist within 14 days of discharge.  Patient was advised to follow-up with her neurologist within 14 days of discharge.  Patient was advised to return to the emergency department if she experiences any recurrence of her symptoms.  Patient and family agreed with the plan and patient was discharged in stable condition.          DISCHARGE Follow Up Recommendations for labs and diagnostics:     Patient was advised to follow-up with her primary care physician who will review her current medications.     Patient was advised to follow-up with her neurologist who will reassess her neuro function.     Patient was advised to follow-up with her  cardiologist who will reassess her cardiac function.     Patient was advised to return to the emergency department if she experiences any recurrence of her symptoms.                 Reasons For Change In Medications and Indications for New Medications:      Day of Discharge     Vital Signs:  Temp:  [97.8 °F (36.6 °C)-98.6 °F (37 °C)] 98.4 °F (36.9 °C)  Heart Rate:  [52-71] 60  Resp:  [11-20] 11  BP: ()/(48-95) 149/88    Physical Exam:  Physical Exam  Vitals reviewed.   Constitutional:       General: She is not in acute distress.  HENT:      Head: Normocephalic and atraumatic.      Nose: Nose normal. No congestion or rhinorrhea.      Mouth/Throat:      Mouth: Mucous membranes are moist.      Pharynx: Oropharynx is clear. No oropharyngeal exudate or posterior oropharyngeal erythema.   Eyes:      Pupils: Pupils are equal, round, and reactive to light.   Cardiovascular:      Pulses: Normal pulses.      Heart sounds: Normal heart sounds. No murmur heard.    No friction rub. No gallop.      Comments: S1 and S2 present.  No tachycardia.  Pulmonary:      Effort: No respiratory distress.      Breath sounds: No wheezing, rhonchi or rales.   Chest:      Chest wall: No tenderness.   Abdominal:      General: Abdomen is flat. Bowel sounds are normal. There is no distension.      Palpations: Abdomen is soft. There is no mass.      Tenderness: There is no abdominal tenderness. There is no right CVA tenderness, left CVA tenderness, guarding or rebound.      Hernia: No hernia is present.   Musculoskeletal:         General: No swelling, tenderness, deformity or signs of injury.      Cervical back: Neck supple. No tenderness.      Right lower leg: No edema.      Left lower leg: No edema.   Skin:     Capillary Refill: Capillary refill takes less than 2 seconds.      Coloration: Skin is not jaundiced.      Findings: No bruising, lesion or rash.   Neurological:      Mental Status: She is alert.      Comments: No facial asymmetry  noted.  Gait and station not tested.   Psychiatric:      Comments: No agitation.              Pertinent  and/or Most Recent Results     LAB RESULTS:      Lab 11/28/22  0601 11/26/22 2039   WBC 13.00* 10.90*   HEMOGLOBIN 13.4 14.7   HEMATOCRIT 42.1 45.1   PLATELETS 175 194   NEUTROS ABS 9.40* 8.90*   LYMPHS ABS 2.20 0.90   MONOS ABS 1.20* 1.00*   EOS ABS 0.00 0.00   MCV 99.1* 97.8*   PROTIME  --  10.7   APTT  --  25.2         Lab 11/28/22  0601 11/27/22  1559 11/26/22 2039   SODIUM 145  --  139   POTASSIUM 4.2  --  4.2   CHLORIDE 109*  --  99   CO2 24.0  --  27.0   ANION GAP 12.0  --  13.0   BUN 27*  --  26*   CREATININE 1.13*  --  1.18*   EGFR 49.3*  --  46.8*   GLUCOSE 99  --  152*   CALCIUM 9.1  --  10.6*   HEMOGLOBIN A1C  --  5.3  --    TSH  --  2.850  --          Lab 11/26/22 2039   TOTAL PROTEIN 8.3   ALBUMIN 4.60   GLOBULIN 3.7   ALT (SGPT) 17   AST (SGOT) 24   BILIRUBIN 0.5   ALK PHOS 110         Lab 11/26/22 2039   TROPONIN T <0.010   PROTIME 10.7   INR 1.04         Lab 11/27/22  1559   CHOLESTEROL 200   LDL CHOL 133*   HDL CHOL 51   TRIGLYCERIDES 89         Lab 11/26/22 2039   ANTIBODY SCREEN Negative         Brief Urine Lab Results  (Last result in the past 365 days)      Color   Clarity   Blood   Leuk Est   Nitrite   Protein   CREAT   Urine HCG        11/26/22 2108 Yellow   Clear   Negative   Negative   Negative   30 mg/dL (1+)               Microbiology Results (last 10 days)     ** No results found for the last 240 hours. **          CT Abdomen Pelvis Without Contrast    Result Date: 11/26/2022  Impression: Impression: 1. Diverticulosis. 2. Small hiatal hernia. Electronically signed by:  Jarret Kee M.D.  11/26/2022 9:39 PM Mountain Time    CT Head Without Contrast    Result Date: 11/27/2022  Impression: 1. No acute intracranial abnormality is identified by CT. 2. Generalized brain volume loss and small vessel ischemic changes. Atherosclerosis. Electronically signed by:  Bennett Aparicio M.D.   11/27/2022 7:23 PM Mountain Time    CT Head Without Contrast    Result Date: 11/26/2022  Impression: 1. Residual contrast limits evaluation for intracranial hemorrhage. Allowing for this, no hemorrhage is identified. 2. Generalized brain volume loss and small vessel ischemic changes. Atherosclerosis. Electronically signed by:  Bennett Aparicio M.D.  11/26/2022 9:26 PM Mountain Time    CT Angiogram Neck    Result Date: 11/28/2022  Impression: 1. No acute intracranial abnormality. No evidence of acute infarct by noncontrast CT head examination. If persistent clinical concern, MRI can provide additional characterization/sensitivity. 2. Mild to moderate volume loss and chronic microvascular ischemic changes. Arteriosclerosis. These findings were discussed with Dr. Krishna on 11/26/2022 6:49 PM (mountain time zone). Electronically signed by:  Yeyo Harper M.D.  11/26/2022 6:49 PM Mountain Time    MRI Brain Without Contrast    Result Date: 11/29/2022  Impression:  1. No acute intracranial abnormality. 2. Diffuse atrophy 3. Diffuse white matter changes compatible small vessel ischemic disease 4. Paranasal sinus disease  Electronically Signed By-Best Cerda On:11/29/2022 4:32 PM This report was finalized on [id number] by  Best Cerda, .    XR Chest 1 View    Result Date: 11/30/2022  Impression: Low lung volumes with left basilar atelectasis  Electronically Signed By-Terence Lemos On:11/30/2022 6:38 PM This report was finalized on [id number] by  Terence Lemos, .    XR Chest 1 View    Result Date: 11/27/2022  Impression: No acute cardiopulmonary abnormality.  Electronically Signed By-Prakash Akhtar MD On:11/27/2022 8:21 AM This report was finalized on [id number] by  Prakash Akhtar MD.    CT Head Without Contrast Stroke Protocol    Result Date: 11/28/2022  Impression: 1. No acute intracranial abnormality. No evidence of acute infarct by noncontrast CT head examination. If persistent clinical  concern, MRI can provide additional characterization/sensitivity. 2. Mild to moderate volume loss and chronic microvascular ischemic changes. Arteriosclerosis. These findings were discussed with Dr. Krishna on 11/26/2022 6:49 PM (mountain time zone). Electronically signed by:  Yeyo Harper M.D.  11/26/2022 6:49 PM Mountain Time    CT Angiogram Head w AI Analysis of LVO    Result Date: 11/26/2022  Impression: 1. No hemodynamically significant stenosis or dissection within the vasculature of the neck. Mild atherosclerotic changes. 2. Mild intracranial atherosclerotic irregularity. No branch vessel occlusion evident. No aneurysm identified. Electronically signed by:  Yeyo Harper M.D.  11/26/2022 7:32 PM Mountain Time    CT CEREBRAL PERFUSION WITH & WITHOUT CONTRAST    Result Date: 11/26/2022  Impression: No perfusion abnormality or mismatch to suggest acute ischemia or infarct by CT examination.  Electronically signed by:  Yeyo Harper M.D.  11/26/2022 7:17 PM Mountain Time              Results for orders placed during the hospital encounter of 11/26/22    Adult Transthoracic Echo Complete W/ Cont if Necessary Per Protocol    Interpretation Summary  •  Left ventricular systolic function is normal. Left ventricular ejection fraction appears to be 56 - 60%.  •  Left ventricular diastolic function is consistent with (grade Ia w/high LAP) impaired relaxation.  •  The right ventricular cavity is mildly dilated.  •  Saline test results are negative for right to left atrial level shunt.  •  Estimated right ventricular systolic pressure from tricuspid regurgitation is normal (<35 mmHg).      Labs Pending at Discharge:      Procedures Performed  Procedure(s):  Biv pacemaker upgradeBiotronik         Consults:   Consults     Date and Time Order Name Status Description    11/29/2022  5:07 PM Inpatient Cardiology Consult Completed     11/28/2022 12:39 AM Inpatient Hospitalist Consult Completed     11/26/2022  8:29 PM  Inpatient Neurology Consult Stroke Completed     11/26/2022  8:29 PM Inpatient Neurology Consult Stroke              Discharge Details        Discharge Medications      New Medications      Instructions Start Date   aspirin 81 MG EC tablet   81 mg, Oral, Daily      atorvastatin 80 MG tablet  Commonly known as: LIPITOR   80 mg, Oral, Nightly      oxyCODONE 5 MG immediate release tablet  Commonly known as: Roxicodone   5 mg, Oral, Every 8 Hours PRN             No Known Allergies      Discharge Disposition: Stable.  Home-Health Care c    Diet:  Hospital:  Diet Order   Procedures   • Diet: Regular/House Diet; Texture: Regular Texture (IDDSI 7); Fluid Consistency: Thin (IDDSI 0)         Discharge Activity: As tolerated.        CODE STATUS:  Code Status and Medical Interventions:   Ordered at: 11/26/22 1830     Code Status (Patient has no pulse and is not breathing):    CPR (Attempt to Resuscitate)     Medical Interventions (Patient has pulse or is breathing):    Full Support     Release to patient:    Routine Release         No future appointments.        Time spent on Discharge including face to face service: 55 minutes    This patient has been examined wearing appropriate Personal Protective Equipment and discussed with hospital infection control department, [facility] department, infectious disease specialist and pulmonologist. 12/01/22      Signature: Electronically signed by Prince Maaz MD, FACP, 12/01/22, 10:58 AM EST.

## 2022-12-01 NOTE — PLAN OF CARE
Problem: Fall Injury Risk  Goal: Absence of Fall and Fall-Related Injury  Outcome: Ongoing, Progressing  Intervention: Identify and Manage Contributors  Recent Flowsheet Documentation  Taken 12/1/2022 0415 by Jaja Vyas RN  Medication Review/Management: medications reviewed  Intervention: Promote Injury-Free Environment  Recent Flowsheet Documentation  Taken 12/1/2022 0415 by Jaja Vyas RN  Safety Promotion/Fall Prevention:   safety round/check completed   room organization consistent   activity supervised     Problem: Skin Injury Risk Increased  Goal: Skin Health and Integrity  Outcome: Ongoing, Progressing  Intervention: Optimize Skin Protection  Recent Flowsheet Documentation  Taken 12/1/2022 0415 by Jaja Vyas RN  Pressure Reduction Techniques: weight shift assistance provided  Pressure Reduction Devices: pressure-redistributing mattress utilized  Skin Protection:   adhesive use limited   transparent dressing maintained   tubing/devices free from skin contact   Goal Outcome Evaluation:

## 2022-12-01 NOTE — PLAN OF CARE
Goal Outcome Evaluation:  Plan of Care Reviewed With: patient, spouse           Outcome Evaluation: Completed re-eval as pt is s/p pacemaker 11/30.  Pt has sling in place LUE and aware of pacemaker precautions.  Pt up in chair on arrival, no c/o pain.  Pt stands with CGAx1, mild posterior LOB but is able to self correct.  Pt ambulates x 40' with hand held assist and CGA/min A, up/down 3 steps with single railing and min A.  Attempted use of straight cane for gait but pt required min A with slow gait speed and poor sequencing with cane.  Worked on pt getting in/OOB with bed flat and no rails, requires min A.  Pt presents with generalized weakness and gait/balance deficits putting her at increased risk for falls.  Will follow 5x/week and recommend IP rehab at d/c.

## 2022-12-01 NOTE — THERAPY TREATMENT NOTE
Acute Care - Speech Language Pathology   Swallow Treatment Note  Addy     Patient Name: Stephanie Sol  : 1940  MRN: [id number]  Today's Date: 2022               Admit Date: 2022  Patient was not wearing a face mask during this therapy encounter. Therapist used appropriate personal protective equipment including mask, eye protection and gloves.  Mask used was standard procedure mask. Appropriate PPE was worn during the entire therapy session. Hand hygiene was completed before and after therapy session. Patient is not in enhanced droplet precautions.             Visit Dx:     ICD-10-CM ICD-9-CM   1. Acute left-sided weakness  R53.1 728.87   2. Slurred speech  R47.81 784.59   3. Ischemic stroke (HCC)  I63.9 434.91   4. Sick sinus syndrome (HCC)  I49.5 427.81   5. Primary osteoarthritis involving multiple joints  M15.9 715.98     Patient Active Problem List   Diagnosis   • Acute left-sided weakness   • Anxiety disorder, unspecified   • Cellulitis of left lower limb   • Encounter for surgical aftercare following surgery on the circulatory system   • Gout, unspecified   • Mixed hyperlipidemia   • Major depressive disorder, single episode, unspecified   • Other lack of coordination   • Presence of cardiac pacemaker   • Sick sinus syndrome (HCC)   • Unspecified osteoarthritis, unspecified site   • Generalized muscle weakness   • Primary hypertension   • Acute kidney injury (HCC)   • Physical debility   • Narrow complex tachycardia (HCC)   • Transient ischemic attack (TIA)     Past Medical History:   Diagnosis Date   • Essential hypertension 2022   • Gout, unspecified 3/18/2022   • Major depressive disorder, single episode, unspecified 3/18/2022   • Presence of cardiac pacemaker 3/18/2022   • Sick sinus syndrome (HCC) 3/18/2022     Past Surgical History:   Procedure Laterality Date   • CARDIAC ELECTROPHYSIOLOGY PROCEDURE N/A 2022    Procedure: Biv pacemaker upgradeBiotronik;  Surgeon:  Karin Pedersen MD;  Location:  [facility];  Service: Cardiovascular;  Laterality: N/A;       SLP Recommendation and Plan       EDUCATION  The patient has been educated in the following areas:   Dysphagia (Swallowing Impairment) Oral Care/Hydration.        SLP GOALS     Row Name 12/01/22 1100          (LTG) Swallow Patient will tolerate safest and least restrictive diet without complications from aspiration.  -CB    Time Frame (Swallow Long Term Goal) by discharge  -CB          (STG) Swallow 1 Patient will participate in ongoing meal assessment  within 24-48 hours to assure safety and tolerance with recommended diet.  -CB    Time Frame (Swallow Short Term Goal 1) 1 week  -CB    Progress/Outcomes (Swallow Short Term Goal 1) --    Comment (Swallow Short Term Goal 1) Patient was seen for DT/meal at breakfast. Patient agreed to MRI and results indicated no acute intracranial abnormality. Therefore, SE is not indicated at this time.  Patient was properly positioned upright in recliner at meal. Patient 's  set up patient's tray for her. Patient tolerating regular consistency as she demonstrates adequate rotary chewing. Patient clears oral cavity with some oral residue at times. Encouraged patient to clear oral cavtiy between bites effectively before utilizing liquid wash. No s/s of aspiration or complications noted. Patient took sip of thins via straw several times but noted coughing x 1. Vocal quality was clear with no apparent wetness noted. Patient remains on room air at this time. ST will sign off at this time as patient tolerating regular diet.  -CB          User Key  (r) = Recorded By, (t) = Taken By, (c) = Cosigned By    Initials Name Provider Type    Mell Cooley, SLP Speech and Language Pathologist                   Time Calculation:                HYACINTH Menard  12/1/2022

## 2022-12-01 NOTE — CASE MANAGEMENT/SOCIAL WORK
Continued Stay Note   Addy     Patient Name: Stephanie Sol  MRN: [id number]  Today's Date: 12/1/2022    Admit Date: 11/26/2022    Plan: SIR pending acceptance; No pre-cert needed.  No PASRR   Discharge Plan     Row Name 12/01/22 1049       Plan    Plan SIR pending acceptance; No pre-cert needed.  No PASRR           Expected Discharge Date and Time     Expected Discharge Date Expected Discharge Time    Dec 1, 2022             Key Gray RN

## 2022-12-01 NOTE — THERAPY RE-EVALUATION
Patient Name: Stephanie Sol  : 1940    MRN: [id number]                              Today's Date: 2022       Admit Date: 2022    Visit Dx:     ICD-10-CM ICD-9-CM   1. Acute left-sided weakness  R53.1 728.87   2. Slurred speech  R47.81 784.59   3. Ischemic stroke (HCC)  I63.9 434.91   4. Sick sinus syndrome (HCC)  I49.5 427.81   5. Primary osteoarthritis involving multiple joints  M15.9 715.98     Patient Active Problem List   Diagnosis   • Acute left-sided weakness   • Anxiety disorder, unspecified   • Cellulitis of left lower limb   • Encounter for surgical aftercare following surgery on the circulatory system   • Gout, unspecified   • Mixed hyperlipidemia   • Major depressive disorder, single episode, unspecified   • Other lack of coordination   • Presence of cardiac pacemaker   • Sick sinus syndrome (HCC)   • Unspecified osteoarthritis, unspecified site   • Generalized muscle weakness   • Primary hypertension   • Acute kidney injury (HCC)   • Physical debility   • Narrow complex tachycardia (HCC)   • Transient ischemic attack (TIA)     Past Medical History:   Diagnosis Date   • Essential hypertension 2022   • Gout, unspecified 3/18/2022   • Major depressive disorder, single episode, unspecified 3/18/2022   • Presence of cardiac pacemaker 3/18/2022   • Sick sinus syndrome (HCC) 3/18/2022     Past Surgical History:   Procedure Laterality Date   • CARDIAC ELECTROPHYSIOLOGY PROCEDURE N/A 2022    Procedure: Biv pacemaker upgradeBiotronik;  Surgeon: Karin Pedersen MD;  Location: [facility] INVASIVE LOCATION;  Service: Cardiovascular;  Laterality: N/A;      General Information     Row Name 22 1342          Physical Therapy Time and Intention    Document Type re-evaluation  -     Mode of Treatment physical therapy  -     Row Name 22 134          General Information    Patient Profile Reviewed yes  -KIT     Prior Level of Function independent:  quad cane  -KIT      Existing Precautions/Restrictions fall;pacemaker;cardiac  -     Barriers to Rehab previous functional deficit  -[facility] Name 12/01/22 1342          Living Environment    People in Home spouse  -[facility] Name 12/01/22 1342          Home Main Entrance    Number of Stairs, Main Entrance five  -     Stair Railings, Main Entrance railings safe and in good condition  -[facility] Name 12/01/22 1342          Stairs Within Home, Primary    Number of Stairs, Within Home, Primary none  -[facility] Name 12/01/22 1342          Cognition    Orientation Status (Cognition) oriented to;person;place;situation  -[facility] Name 12/01/22 1342          Safety Issues, Functional Mobility    Impairments Affecting Function (Mobility) balance;endurance/activity tolerance;strength  -           User Key  (r) = Recorded By, (t) = Taken By, (c) = Cosigned By    Initials Name Provider Type     Jovita Brito, THANH Physical Therapist               Mobility     [facility] Name 12/01/22 1451 12/01/22 1343       Bed Mobility    Bed Mobility -- supine-sit;sit-supine  -    Supine-Sit [place] (Bed Mobility) minimum assist (75% patient effort);verbal cues  - --    Sit-Supine [place] (Bed Mobility) verbal cues  - --    Comment, (Bed Mobility) -- bed flat  -[facility] Name 12/01/22 1451          Sit-Stand Transfer    Sit-Stand [place] (Transfers) contact guard  -[facility] Name 12/01/22 1451          Gait/Stairs (Locomotion)    [place] Level (Gait) contact guard;minimum assist (75% patient effort)  -     Assistive Device (Gait) cane, straight  -     Distance in Feet (Gait) hand held assist x 40', min A with straight cane x 25'  -     Deviations/Abnormal Patterns (Gait) gait speed decreased;base of support, narrow;stride length decreased;festinating/shuffling  -     [place] Level (Stairs) minimum assist (75% patient effort)  -     Handrail Location (Stairs) right side (ascending)  -     Number of Steps (Stairs)  3  -     Ascending Technique (Stairs) step-to-step  -     Descending Technique (Stairs) step-to-step  -     Stairs, Safety Issues loses balance backward  -           User Key  (r) = Recorded By, (t) = Taken By, (c) = Cosigned By    Initials Name Provider Type    Jovita Benavides, THANH Physical Therapist               Obj/Interventions     [facility] Name 12/01/22 1509          Range of Motion Comprehensive    Comment, General Range of Motion LUE in sling post pacemaker.  BLE AROM WFLs, c/o R knee stiffness from arthritis  -[facility] Name 12/01/22 1509          Strength Comprehensive (MMT)    Comment, General Manual Muscle Testing (MMT) Assessment [facility] 4/5  -[facility] Name 12/01/22 1509          Balance    Balance Assessment sitting static balance;sitting dynamic balance;standing static balance;standing dynamic balance  -     Static Sitting Balance independent  -     Dynamic Sitting Balance independent  -     Position, Sitting Balance sitting in chair  -     Static Standing Balance contact guard  -     Dynamic Standing Balance minimal assist  -     Position/Device Used, Standing Balance cane, straight  -[facility] Name 12/01/22 1509          Sensory Assessment (Somatosensory)    Sensory Assessment (Somatosensory) sensation intact  -           User Key  (r) = Recorded By, (t) = Taken By, (c) = Cosigned By    Initials Name Provider Type    Jovita Benavides PT Physical Therapist               Goals/Plan     [facility] Name 12/01/22 1516          Bed Mobility Goal 1 (PT)    Activity/Assistive Device (Bed Mobility Goal 1, PT) bed mobility activities, all  -     [place] Level/Cues Needed (Bed Mobility Goal 1, PT) independent  -     Time Frame (Bed Mobility Goal 1, PT) 1 week  -[facility] Name 12/01/22 1516          Transfer Goal 1 (PT)    Activity/Assistive Device (Transfer Goal 1, PT) transfers, all  -     [place] Level/Cues Needed (Transfer Goal 1, PT) independent  -     Time Frame (Transfer  Goal 1, PT) 1 week  -     Progress/Outcome (Transfer Goal 1, PT) goal ongoing  -     Row Name 12/01/22 1516          Gait Training Goal 1 (PT)    Activity/Assistive Device (Gait Training Goal 1, PT) gait (walking locomotion);cane, quad  -     [place] Level (Gait Training Goal 1, PT) modified independence  -     Distance (Gait Training Goal 1, PT) 100'  -     Time Frame (Gait Training Goal 1, PT) 1 week  -     Progress/Outcome (Gait Training Goal 1, PT) goal revised this date  -     Row Name 12/01/22 1516          Stairs Goal 1 (PT)    Activity/Assistive Device (Stairs Goal 1, PT) stairs, all skills  -     [place] Level/Cues Needed (Stairs Goal 1, PT) contact guard required  -     Number of Stairs (Stairs Goal 1, PT) 5  -     Time Frame (Stairs Goal 1, PT) 1 week  -     Progress/Outcome (Stairs Goal 1, PT) new goal  -     Row Name 12/01/22 1516          Problem Specific Goal 1 (PT)    Progress/Outcome (Problem Specific Goal 1, PT) goal no longer appropriate  -     Row Name 12/01/22 1516          Therapy Assessment/Plan (PT)    Planned Therapy Interventions (PT) balance training;bed mobility training;gait training;transfer training;strengthening;stair training  -           User Key  (r) = Recorded By, (t) = Taken By, (c) = Cosigned By    Initials Name Provider Type     Jovita Brito, PT Physical Therapist               Clinical Impression     Row Name 12/01/22 1511          Pain    Pretreatment Pain Rating 0/10 - no pain  -     Posttreatment Pain Rating 0/10 - no pain  -     Row Name 12/01/22 1511          Plan of Care Review    Plan of Care Reviewed With patient;spouse  -     Outcome Evaluation Completed re-eval as pt is s/p pacemaker 11/30.  Pt has sling in place LUE and aware of pacemaker precautions.  Pt up in chair on arrival, no c/o pain.  Pt stands with CGAx1, mild posterior LOB but is able to self correct.  Pt ambulates x 40' with hand held assist and CGA/min A,  up/down 3 steps with single railing and min A.  Attempted use of straight cane for gait but pt required min A with slow gait speed and poor sequencing with cane.  Worked on pt getting in/OOB with bed flat and no rails, requires min A.  Pt presents with generalized weakness and gait/balance deficits putting her at increased risk for falls.  Will follow 5x/week and recommend IP rehab at d/c.  -     Row Name 12/01/22 1511          Therapy Assessment/Plan (PT)    Rehab Potential (PT) good, to achieve stated therapy goals  -     Criteria for Skilled Interventions Met (PT) yes;skilled treatment is necessary  -     Therapy Frequency (PT) 5 times/wk  -     Row Name 12/01/22 1511          Vital Signs    Post Systolic BP Rehab 142  -     Post Treatment Diastolic BP 62  -     Posttreatment Heart Rate (beats/min) 60  -     O2 Delivery Pre Treatment room air  -     O2 Delivery Intra Treatment room air  -     O2 Delivery Post Treatment room air  -     Row Name 12/01/22 1511          Positioning and Restraints    Pre-Treatment Position sitting in chair/recliner  -     Post Treatment Position chair  -     In Chair notified nsg;reclined;call light within reach;encouraged to call for assist;exit alarm on  -           User Key  (r) = Recorded By, (t) = Taken By, (c) = Cosigned By    Initials Name Provider Type     Jovita Brito, PT Physical Therapist               Outcome Measures     Row Name 12/01/22 1518          How much help from another person do you currently need...    Turning from your back to your side while in flat bed without using bedrails? 4  -JH     Moving from lying on back to sitting on the side of a flat bed without bedrails? 3  -JH     Moving to and from a bed to a chair (including a wheelchair)? 3  -JH     Standing up from a chair using your arms (e.g., wheelchair, bedside chair)? 3  -JH     Climbing 3-5 steps with a railing? 2  -JH     To walk in hospital room? 3  -JH     AM-PAC 6  Clicks Score (PT) 18  -JH     Highest level of mobility 6 --> Walked 10 steps or more  -JH     Row Name 12/01/22 1518 12/01/22 1343       Modified Van Scale    Modified Van Scale 4 - Moderately severe disability.  Unable to walk without assistance, and unable to attend to own bodily needs without assistance.  -JH 4 - Moderately severe disability.  Unable to walk without assistance, and unable to attend to own bodily needs without assistance.  -LS    Row Name 12/01/22 1518 12/01/22 1343       Functional Assessment    Outcome Measure Options AM-PAC 6 Clicks Basic Mobility (PT);Modified Van  -JH Modified Patricia  -LS          User Key  (r) = Recorded By, (t) = Taken By, (c) = Cosigned By    Initials Name Provider Type    Jovita Benavides, PT Physical Therapist    Harrison Boyd, OT Occupational Therapist                             Physical Therapy Education     Title: PT OT SLP Therapies (Resolved)     Topic: Physical Therapy (Resolved)     Point: Mobility training (Resolved)     Learning Progress Summary           Patient Acceptance, E,TB, VU by SS at 12/1/2022 1431    Comment: Keep blood pressure below 130/80  LDL less than 70 (your value was 133)  B12 level above 500 (not collected)  A1c below 6.0 (not collected)    Acceptance, E, VU by NE at 12/1/2022 1114    Acceptance, E, NR by NE at 11/30/2022 1547    Eager, E, NR by NE at 11/29/2022 1147    Acceptance, E, VU by ED at 11/29/2022 0253    Acceptance, E,TB, VU,NR by  at 11/28/2022 1042                   Point: Precautions (Resolved)     Learning Progress Summary           Patient Acceptance, E,TB, VU by SS at 12/1/2022 1431    Comment: Keep blood pressure below 130/80  LDL less than 70 (your value was 133)  B12 level above 500 (not collected)  A1c below 6.0 (not collected)    Acceptance, E, VU by NE at 12/1/2022 1114    Acceptance, E, NR by NE at 11/30/2022 1547    Eager, E, NR by NE at 11/29/2022 1147    Acceptance, E, VU by ED at 11/29/2022 0253     Acceptance, E,TB, VU,NR by  at 11/28/2022 1042                               User Key     Initials Effective Dates Name Provider Type Discipline     06/16/21 -  Kalee Mason, PT Physical Therapist PT    SS 06/16/21 -  Madonna Diaz, RN Registered Nurse Nurse    ED 09/30/21 -  Marcy Jc, RN Registered Nurse Nurse    NE 02/24/22 -  Lory Escalante, ELAYNE Registered Nurse Nurse              PT Recommendation and Plan  Planned Therapy Interventions (PT): balance training, bed mobility training, gait training, transfer training, strengthening, stair training  Plan of Care Reviewed With: patient, spouse  Outcome Evaluation: Completed re-eval as pt is s/p pacemaker 11/30.  Pt has sling in place LUE and aware of pacemaker precautions.  Pt up in chair on arrival, no c/o pain.  Pt stands with CGAx1, mild posterior LOB but is able to self correct.  Pt ambulates x 40' with hand held assist and CGA/min A, up/down 3 steps with single railing and min A.  Attempted use of straight cane for gait but pt required min A with slow gait speed and poor sequencing with cane.  Worked on pt getting in/OOB with bed flat and no rails, requires min A.  Pt presents with generalized weakness and gait/balance deficits putting her at increased risk for falls.  Will follow 5x/week and recommend IP rehab at d/c.     Time Calculation:    PT Charges     Row Name 12/01/22 1519             Time Calculation    Start Time 0920  -      Stop Time 0950  -      Time Calculation (min) 30 min  -      PT Received On 12/01/22  -      PT - Next Appointment 12/02/22  -      PT Goal Re-Cert Due Date 12/15/22  -         Time Calculation- PT    Total Timed Code Minutes- PT 10 minute(s)  -            User Key  (r) = Recorded By, (t) = Taken By, (c) = Cosigned By    Initials Name Provider Type     Jovita Brito, THANH Physical Therapist              Therapy Charges for Today     Code Description Service Date Service Provider Modifiers Qty     [id number]  PT RE-EVAL ESTABLISHED PLAN 2 12/1/2022 Jovita Brito, PT GP 1    [id number]  GAIT TRAINING EA 15 MIN 12/1/2022 Jovita Brito, PT  1          PT G-Codes  Outcome Measure Options: AM-PAC 6 Clicks Basic Mobility (PT), Modified Patricia  AM-PAC 6 Clicks Score (PT): 18  Modified [place] Scale: 4 - Moderately severe disability.  Unable to walk without assistance, and unable to attend to own bodily needs without assistance.  PT Discharge Summary  Anticipated Discharge Disposition (PT): inpatient rehabilitation facility    Jovita Brito, THANH  12/1/2022

## 2022-12-01 NOTE — DISCHARGE INSTRUCTIONS
Patient was advised to follow-up with her primary care physician who will review her current medications.    Patient was advised to follow-up with her neurologist who will reassess her neuro function.    Patient was advised to follow-up with her cardiologist who will reassess her cardiac function.    Patient was advised to return to the emergency department if she experiences any recurrence of her symptoms.    Modifiable Stroke Risk Factors: Follow these suggestions to decrease chances of having stroke:    Keep blood pressure below 130/80  LDL less than 70 (your value was 133)  B12 level above 500 (not collected)  A1c below 6.0 (not collected)  Follow a low fat low cholesterol diet and moderate exercise to keep weight at a heathy level.   Take all medications as directed.  Quit smoking if you smoke,do not drink alcohol and do not use medications not prescribed for you.   Follow up with physicians as indicated above.

## 2022-12-01 NOTE — PROGRESS NOTES
Cardiology progress note  Jarret Iraheta MD, PhD      Patient Care Team:  Lior Oscar MD as PCP - General (Internal Medicine)    CHIEF COMPLAINT: Strokelike symptoms, A. fib    HISTORY OF PRESENT ILLNESS:    82-year-old female with essential pretension sick sinus syndrome presence of pacemaker, history of anxiety depression, acute left-sided weakness.  She was brought to the ER for evaluation noted again to have left-sided weakness.  Neurology was consulted ultimately given tPA for presumed stroke.  CT was performed, MRI is currently pending.  There is no obvious vessel occlusion intracerebral he.  EKG demonstrated atrial fibrillation which was rate controlled and cardiology has been consulted for evaluation management.    EKG reviewed and interpreted me from November 26  Appears to have junctional escape with wandering atrial pacemaker, concern for complete heart block    2D echo reviewed and interpreted by me demonstrates EF of 55 to 60% with grade 1 diastolic dysfunction, RV is mildly dilated, saline results were negative for interatrial shunt, normal right-sided pressures with normal RV systolic function  ===============================================  Doing well the day  Status post atrial lead revision for AV pacing optimization with sinus bradycardia, accelerated junctional rhythm to restore AV synchrony with sick sinus syndrome  Has done well  Bandage clean and dry not saturated no swelling no bleeding, mildly sore    No acute issues overnight    Labs reviewed creatinine 1.1, electrolytes are normal CO2 24, TSH okay, lipids okay  Past Medical History:   Diagnosis Date   • Essential hypertension 11/26/2022   • Gout, unspecified 3/18/2022   • Major depressive disorder, single episode, unspecified 3/18/2022   • Presence of cardiac pacemaker 3/18/2022   • Sick sinus syndrome (HCC) 3/18/2022     Past Surgical History:   Procedure Laterality Date   • CARDIAC ELECTROPHYSIOLOGY PROCEDURE N/A 11/30/2022     "Procedure: Biv pacemaker upgradeBiotronik;  Surgeon: Karin Pedersen MD;  Location: [facility] CATH INVASIVE LOCATION;  Service: Cardiovascular;  Laterality: N/A;     History reviewed. No pertinent family history.  Social History     Tobacco Use   • Smoking status: Never   • Smokeless tobacco: Never   Vaping Use   • Vaping Use: Never used   Substance Use Topics   • Alcohol use: Never   • Drug use: Never     No medications prior to admission.     Allergies:  Patient has no known allergies.    REVIEW OF SYSTEMS:  Please see the above history of present illness for pertinent positives and negatives.  The remainder of the patient's systems have been reviewed and are negative.    Vital Signs  Temp:  [97.5 °F (36.4 °C)-98.6 °F (37 °C)] 98.6 °F (37 °C)  Heart Rate:  [52-71] 60  Resp:  [12-20] 12  BP: ()/(48-95) 158/56    Flowsheet Rows    Flowsheet Row First Filed Value   Admission Height 157.5 cm (62\") Documented at 11/26/2022 2053   Admission Weight 64 kg (141 lb 1.5 oz) Documented at 11/26/2022 2053           Physical Exam:  Physical Exam   Constitutional: Patient appears no distress  HEENT:   Head: Normocephalic and atraumatic.   Eyes:  Pupils are equal, round, and reactive to light. EOM are intact. Sclerae are anicteric and noninjected.  Mouth and Throat: Patient has moist mucous membranes. Oropharynx is clear of any erythema or exudate.     Neck: Neck supple. No JVD present. No thyromegaly present. No lymphadenopathy present.  Cardiovascular: Normal rate and rhythm S1 normal and S2 normal.  Exam reveals no gallop and no friction rub.  No murmur heard.  Pacer site clean and dry none saturated bandage no erythema or bruising  Pulmonary/Chest: Lungs are clear to auscultation bilaterally. No respiratory distress. No wheezes. No rhonchi. No rales.   Abdominal: Soft. Bowel sounds are normal. No distension and no mass. There is no hepatosplenomegaly. There is no tenderness.   Musculoskeletal: Normal muscle " tone  Extremities: No edema. Pulses are palpable in all 4 extremities.  Neurological: Patient is alert and oriented to person, place, and kulwant residual left-sided weakness  Skin: Skin is warm. No rash noted. Nails show no clubbing.  No cyanosis or erythema.     Results Review:    I reviewed the patient's new clinical results.  Lab Results (most recent)     Procedure Component Value Units Date/Time    Hemoglobin A1c [[id number]]  (Normal) Collected: 11/27/22 1559    Specimen: Blood Updated: 11/28/22 1036     Hemoglobin A1C 5.3 %     Narrative:      Hemoglobin A1C Reference Range:    <5.7 %        Normal  5.7-6.4 %     Increased risk for diabetes  > 6.4 %        Diabetes       These guidelines have been recommended by the American Diabetic Association for Hgb A1c.      The following 2010 guidelines have been recommended by the American Diabetes Association for Hemoglobin A1c.    HBA1c 5.7-6.4% Increased risk for future diabetes (pre-diabetes)  HBA1c     >6.4% Diabetes      Basic Metabolic Panel [[id number]]  (Abnormal) Collected: 11/28/22 0601    Specimen: Blood Updated: 11/28/22 0701     Glucose 99 mg/dL      BUN 27 mg/dL      Creatinine 1.13 mg/dL      Sodium 145 mmol/L      Potassium 4.2 mmol/L      Comment: Slight hemolysis detected by analyzer. Results may be affected.        Chloride 109 mmol/L      CO2 24.0 mmol/L      Calcium 9.1 mg/dL      BUN/Creatinine Ratio 23.9     Anion Gap 12.0 mmol/L      eGFR 49.3 mL/min/1.73      Comment: National Kidney Foundation and American Society of Nephrology (ASN) Task Force recommended calculation based on the Chronic Kidney Disease Epidemiology Collaboration (CKD-EPI) equation refit without adjustment for race.       Narrative:      GFR Normal >60  Chronic Kidney Disease <60  Kidney Failure <15    The GFR formula is only valid for adults with stable renal function between ages 18 and 70.    CBC & Differential [[id number]]  (Abnormal) Collected: 11/28/22 0601    Specimen: Blood  Updated: 11/28/22 0624    Narrative:      The following orders were created for panel order CBC & Differential.  Procedure                               Abnormality         Status                     ---------                               -----------         ------                     CBC Auto Differential[[id number]]        Abnormal            Final result                 Please view results for these tests on the individual orders.    CBC Auto Differential [[id number]]  (Abnormal) Collected: 11/28/22 0601    Specimen: Blood Updated: 11/28/22 0624     WBC 13.00 10*3/mm3      RBC 4.25 10*6/mm3      Hemoglobin 13.4 g/dL      Hematocrit 42.1 %      MCV 99.1 fL      MCH 31.6 pg      MCHC 31.9 g/dL      RDW 15.2 %      RDW-SD 51.2 fl      MPV 9.1 fL      Platelets 175 10*3/mm3      Neutrophil % 72.7 %      Lymphocyte % 17.2 %      Monocyte % 9.5 %      Eosinophil % 0.2 %      Basophil % 0.4 %      Neutrophils, Absolute 9.40 10*3/mm3      Lymphocytes, Absolute 2.20 10*3/mm3      Monocytes, Absolute 1.20 10*3/mm3      Eosinophils, Absolute 0.00 10*3/mm3      Basophils, Absolute 0.10 10*3/mm3      nRBC 0.1 /100 WBC     POC Glucose Once [[id number]]  (Abnormal) Collected: 11/27/22 1744    Specimen: Blood Updated: 11/27/22 1745     Glucose 110 mg/dL      Comment: Serial Number: [id number]:  [id number]       TSH [[id number]]  (Normal) Collected: 11/27/22 1559    Specimen: Blood Updated: 11/27/22 1640     TSH 2.850 uIU/mL     T4, Free [[id number]]  (Normal) Collected: 11/27/22 1559    Specimen: Blood Updated: 11/27/22 1640     Free T4 1.20 ng/dL     Narrative:      Results may be falsely increased if patient taking Biotin.      Lipid Panel [[id number]]  (Abnormal) Collected: 11/27/22 1559    Specimen: Blood Updated: 11/27/22 1634     Total Cholesterol 200 mg/dL      Triglycerides 89 mg/dL      HDL Cholesterol 51 mg/dL      LDL Cholesterol  133 mg/dL      VLDL Cholesterol 16 mg/dL      LDL/HDL Ratio 2.57    Narrative:       Cholesterol Reference Ranges  (U.S. Department of Health and Human Services ATP III Classifications)    Desirable          <200 mg/dL  Borderline High    200-239 mg/dL  High Risk          >240 mg/dL      Triglyceride Reference Ranges  (U.S. Department of Health and Human Services ATP III Classifications)    Normal           <150 mg/dL  Borderline High  150-199 mg/dL  High             200-499 mg/dL  Very High        >500 mg/dL    HDL Reference Ranges  (U.S. Department of Health and Human Services ATP III Classifications)    Low     <40 mg/dl (major risk factor for CHD)  High    >60 mg/dl ('negative' risk factor for CHD)        LDL Reference Ranges  (U.S. Department of Health and Human Services ATP III Classifications)    Optimal          <100 mg/dL  Near Optimal     100-129 mg/dL  Borderline High  130-159 mg/dL  High             160-189 mg/dL  Very High        >189 mg/dL    POC Glucose Once [[id number]]  (Abnormal) Collected: 11/27/22 1128    Specimen: Blood Updated: 11/27/22 1129     Glucose 108 mg/dL      Comment: Serial Number: [id number]:  [id number]       [place] Draw [[id number]] Collected: 11/26/22 2039    Specimen: Blood Updated: 11/26/22 2146    Narrative:      The following orders were created for panel order [place] Draw.  Procedure                               Abnormality         Status                     ---------                               -----------         ------                     Green Top (Gel)[[id number]]                                  Final result               Lavender Top[[id number]]                                     Final result               Gold Top - SST[[id number]]                                   Final result               Light Blue Top[[id number]]                                   Final result                 Please view results for these tests on the individual orders.    Lavender Top [[id number]] Collected: 11/26/22 2039    Specimen: Blood Updated: 11/26/22 2146     Extra  Tube hold for add-on     Comment: Auto resulted       Green Top (Gel) [[id number]] Collected: 11/26/22 2039    Specimen: Blood Updated: 11/26/22 2146     Extra Tube Hold for add-ons.     Comment: Auto resulted.       Gold Top - SST [[id number]] Collected: 11/26/22 2039    Specimen: Blood Updated: 11/26/22 2146     Extra Tube Hold for add-ons.     Comment: Auto resulted.       Light Blue Top [[id number]] Collected: 11/26/22 2039    Specimen: Blood Updated: 11/26/22 2146     Extra Tube Hold for add-ons.     Comment: Auto resulted       Urinalysis With Microscopic If Indicated (No Culture) - Urine, Catheter [[id number]]  (Abnormal) Collected: 11/26/22 2108    Specimen: Urine, Catheter Updated: 11/26/22 2121     Color, UA Yellow     Appearance, UA Clear     pH, UA <=5.0     Specific Gravity, UA 1.044     Glucose, UA Negative     Ketones, UA Negative     Bilirubin, UA Negative     Blood, UA Negative     Protein, UA 30 mg/dL (1+)     Leuk Esterase, UA Negative     Nitrite, UA Negative     Urobilinogen, UA 0.2 E.U./dL    Urinalysis, Microscopic Only - Urine, Catheter [[id number]]  (Abnormal) Collected: 11/26/22 2108    Specimen: Urine, Catheter Updated: 11/26/22 2121     RBC, UA 0-2 /HPF      WBC, UA 0-2 /HPF      Bacteria, UA None Seen /HPF      Squamous Epithelial Cells, UA 3-6 /HPF      Hyaline Casts, UA 0-2 /LPF      Methodology Automated Microscopy    Comprehensive Metabolic Panel [[id number]]  (Abnormal) Collected: 11/26/22 2039    Specimen: Blood Updated: 11/26/22 2112     Glucose 152 mg/dL      BUN 26 mg/dL      Creatinine 1.18 mg/dL      Sodium 139 mmol/L      Potassium 4.2 mmol/L      Chloride 99 mmol/L      CO2 27.0 mmol/L      Calcium 10.6 mg/dL      Total Protein 8.3 g/dL      Albumin 4.60 g/dL      ALT (SGPT) 17 U/L      AST (SGOT) 24 U/L      Alkaline Phosphatase 110 U/L      Total Bilirubin 0.5 mg/dL      Globulin 3.7 gm/dL      A/G Ratio 1.2 g/dL      BUN/Creatinine Ratio 22.0     Anion Gap 13.0 mmol/L      eGFR  46.8 mL/min/1.73      Comment: National Kidney Foundation and American Society of Nephrology (ASN) Task Force recommended calculation based on the Chronic Kidney Disease Epidemiology Collaboration (CKD-EPI) equation refit without adjustment for race.       Narrative:      GFR Normal >60  Chronic Kidney Disease <60  Kidney Failure <15    The GFR formula is only valid for adults with stable renal function between ages 18 and 70.    Troponin [[id number]]  (Normal) Collected: 11/26/22 2039    Specimen: Blood Updated: 11/26/22 2112     Troponin T <0.010 ng/mL     Narrative:      Troponin T Reference Range:  <= 0.03 ng/mL-   Negative for AMI  >0.03 ng/mL-     Abnormal for myocardial necrosis.  Clinicians would have to utilize clinical acumen, EKG, Troponin and serial changes to determine if it is an Acute Myocardial Infarction or myocardial injury due to an underlying chronic condition.       Results may be falsely decreased if patient taking Biotin.      Protime-INR [[id number]]  (Normal) Collected: 11/26/22 2039    Specimen: Blood Updated: 11/26/22 2105     Protime 10.7 Seconds      INR 1.04    aPTT [[id number]]  (Normal) Collected: 11/26/22 2039    Specimen: Blood Updated: 11/26/22 2105     PTT 25.2 seconds     CBC & Differential [[id number]]  (Abnormal) Collected: 11/26/22 2039    Specimen: Blood Updated: 11/26/22 2047    Narrative:      The following orders were created for panel order CBC & Differential.  Procedure                               Abnormality         Status                     ---------                               -----------         ------                     CBC Auto Differential[[id number]]        Abnormal            Final result                 Please view results for these tests on the individual orders.    CBC Auto Differential [[id number]]  (Abnormal) Collected: 11/26/22 2039    Specimen: Blood Updated: 11/26/22 2047     WBC 10.90 10*3/mm3      RBC 4.61 10*6/mm3      Hemoglobin 14.7 g/dL       Hematocrit 45.1 %      MCV 97.8 fL      MCH 31.8 pg      MCHC 32.5 g/dL      RDW 15.5 %      RDW-SD 52.5 fl      MPV 9.6 fL      Platelets 194 10*3/mm3      Neutrophil % 81.9 %      Lymphocyte % 8.2 %      Monocyte % 9.4 %      Eosinophil % 0.1 %      Basophil % 0.4 %      Neutrophils, Absolute 8.90 10*3/mm3      Lymphocytes, Absolute 0.90 10*3/mm3      Monocytes, Absolute 1.00 10*3/mm3      Eosinophils, Absolute 0.00 10*3/mm3      Basophils, Absolute 0.00 10*3/mm3      nRBC 0.0 /100 WBC           Imaging Results (Most Recent)     Procedure Component Value Units Date/Time    XR Chest 1 View [[id number]] Collected: 11/30/22 1837     Updated: 11/30/22 1840    Narrative:      DATE OF EXAM:  11/30/2022 6:19 PM     PROCEDURE:  XR CHEST 1 VW-     INDICATIONS:  Post ICD / Pacer Implant; R53.1-Weakness; R47.81-Slurred speech;  I63.9-Cerebral infarction, unspecified; I49.5-Sick sinus syndrome     COMPARISON:  11/26/2022     TECHNIQUE:   Single radiographic AP view of the chest was obtained.     FINDINGS:  Left subclavian dual lead pacer noted. Heart size stable. Low lung  volumes. Right lung grossly clear. Strandy atelectasis in the left lung  base        Impression:      Low lung volumes with left basilar atelectasis     Electronically Signed By-Terence Lemos On:11/30/2022 6:38 PM  This report was finalized on [id number] by  Terence Lemos, .    MRI Brain Without Contrast [[id number]] Collected: 11/29/22 1534     Updated: 11/29/22 1634    Narrative:      MRI BRAIN WO CONTRAST-     Date of Exam: 11/29/2022 3:00 PM     Indication: Stroke, follow up; R53.1-Weakness; R47.81-Slurred speech;  I63.9-Cerebral infarction, unspecified.     Technique: Routine multiplanar multisequence MR imaging of the brain was  performed without the administration of   gadolinium contrast.     Comparison Exams: CT head 11/27/2022 and prior     FINDINGS:         There are no areas of restricted diffusion.        There is no evidence of  intracranial hemorrhage.      Diffuse atrophy is again noted     There is no mass, mass effect, or hydrocephalus.      No abnormal extra-axial fluid collections are seen.      Moderate white matter changes within the deep, periventricular and to  lesser subcortical white matter as well as within the wandy and basal  ganglia are noted. Findings are nonspecific but most compatible small  vessel ischemic disease in this age group     Major intracranial vascular flow voids are preserved.      Sella and suprasellar cistern are within normal limits.        Cranio-vertebral junction is normal.     Globes and orbits are within normal limits.      Mucosal thickening and partial opacification of the paranasal sinuses  noted. Mastoid air cells are grossly clear.       Impression:         1. No acute intracranial abnormality.  2. Diffuse atrophy  3. Diffuse white matter changes compatible small vessel ischemic disease  4. Paranasal sinus disease     Electronically Signed By-Best Cerda On:11/29/2022 4:32 PM  This report was finalized on [id number] by  Best Cerda, .    CT Head Without Contrast Stroke Protocol [[id number]] Collected: 11/26/22 1839     Updated: 11/28/22 0744    Narrative:      EXAMINATION: CT HEAD WITHOUT CONTRAST    DATE: 11/26/2022 8:29 PM    INDICATION: Trauma, Pain    COMPARISON: None available at the time of this dictation.    TECHNIQUE: Noncontrast imaging obtained from the vertex to the skull base.  CT dose lowering techniques were used, to include: automated exposure control, adjustment for patient size, and or use of iterative reconstruction.?    FINDINGS:    Soft Tissues: No significant soft tissue abnormality.    Skull: No underlying skull fracture or radiopaque foreign body.    Sinuses: Paranasal sinuses are clear.    Mastoids: Mastoid air cells are clear.     Globes and Orbits: Globes and orbits are intact.    Brain: No acute hemorrhage.  No midline shift, masses, or mass effect.  No  evidence of acute infarct by noncontrast CT.    Ventricles and Cisterns: Ventricular size and configuration is within normal limits. Basal cisterns are patent. No abnormal extra-axial fluid collection.    Senescent Changes: Mild to moderate volume loss and chronic microvascular ischemic changes. Arteriosclerosis.        Impression:          1. No acute intracranial abnormality. No evidence of acute infarct by noncontrast CT head examination. If persistent clinical concern, MRI can provide additional characterization/sensitivity.    2. Mild to moderate volume loss and chronic microvascular ischemic changes. Arteriosclerosis.      These findings were discussed with Dr. Krishna on 11/26/2022 6:49 PM ([place] time [place]).    Electronically signed by:  Yeyo Harper M.D.    11/26/2022 6:49 PM [facility]    CT Angiogram Neck [[id number]] Collected: 11/26/22 1839     Updated: 11/28/22 0744    Narrative:      EXAMINATION: CT HEAD WITHOUT CONTRAST    DATE: 11/26/2022 8:29 PM    INDICATION: Trauma, Pain    COMPARISON: None available at the time of this dictation.    TECHNIQUE: Noncontrast imaging obtained from the vertex to the skull base.  CT dose lowering techniques were used, to include: automated exposure control, adjustment for patient size, and or use of iterative reconstruction.?    FINDINGS:    Soft Tissues: No significant soft tissue abnormality.    Skull: No underlying skull fracture or radiopaque foreign body.    Sinuses: Paranasal sinuses are clear.    Mastoids: Mastoid air cells are clear.     Globes and Orbits: Globes and orbits are intact.    Brain: No acute hemorrhage.  No midline shift, masses, or mass effect.  No evidence of acute infarct by noncontrast CT.    Ventricles and Cisterns: Ventricular size and configuration is within normal limits. Basal cisterns are patent. No abnormal extra-axial fluid collection.    Senescent Changes: Mild to moderate volume loss and chronic microvascular ischemic changes.  Arteriosclerosis.        Impression:          1. No acute intracranial abnormality. No evidence of acute infarct by noncontrast CT head examination. If persistent clinical concern, MRI can provide additional characterization/sensitivity.    2. Mild to moderate volume loss and chronic microvascular ischemic changes. Arteriosclerosis.      These findings were discussed with Dr. Krishna on 11/26/2022 6:49 PM ([place] time zone).    Electronically signed by:  Yeyo Harper M.D.    11/26/2022 6:49 PM [facility]    CT Head Without Contrast [[id number]] Collected: 11/27/22 1918     Updated: 11/27/22 2124    Narrative:      EXAMINATION: CT HEAD WITHOUT CONTRAST    DATE: 11/27/2022 8:59 PM    INDICATION: Follow-up stroke. Status post TPA. Hypertension. Persistent left-sided weakness.    COMPARISON: CT head 11/26/2022.    TECHNIQUE: Noncontrast CT imaging through the brain was performed in the axial plane. Coronal reformats were generated. CT dose lowering techniques were used, to include: automated exposure control, adjustment for patient size, and or use of iterative   reconstruction.    FINDINGS:    Intracranial contents:    Most of the previously seen residual contrast has resolved.    The ventricles and sulci are at least moderately enlarged.  There is no midline shift or mass effect.  There is no abnormal extra-axial fluid collection or acute intracranial hemorrhage.    Patchy hypodensities in the cerebral white matter. Atherosclerotic calcification of the internal carotid arteries.    Bones and extracranial soft tissues:    The calvarium is intact. Mucosal thickening in the left maxillary sinus, unchanged. Mucosal thickening in the anterior ethmoid air cells and left frontal recess, slightly progressed. Minimal mucosal thickening in the sphenoid sinuses.        Impression:        1. No acute intracranial abnormality is identified by CT.    2. Generalized brain volume loss and small vessel ischemic changes.  Atherosclerosis.                Electronically signed by:  Bennett Aparicio M.D.    11/27/2022 7:23 PM [place] Time    XR Chest 1 View [[id number]] Collected: 11/27/22 0819     Updated: 11/27/22 0823    Narrative:      EXAMINATION: XR CHEST 1 VW-     DATE OF EXAM: 11/26/2022 9:00 PM     INDICATION: Acute Stroke Protocol (onset < 12 hrs).     COMPARISON: None available     TECHNIQUE: Portable AP view of the chest was obtained.     FINDINGS:  There is a left chest wall single lead pacemaker in expected position.  The cardiomediastinal silhouette is within normal limits. Pulmonary  vascularity appears normal. There is no focal airspace consolidation,  pleural effusion, or pneumothorax. There are degenerative changes of the  thoracic spine.       Impression:      No acute cardiopulmonary abnormality.     Electronically Signed By-Prakash Akhtar MD On:11/27/2022 8:21 AM  This report was finalized on [id number] by  Prakash Akhtar MD.    CT Abdomen Pelvis Without Contrast [[id number]] Collected: 11/26/22 2130     Updated: 11/26/22 2340    Narrative:      CT OF THE ABDOMEN AND PELVIS WITH CONTRAST    Clinical indication: Abdominal pain.    Comparison: None.    Procedure: Iodinated contrast administered intravenously without incident. CT images of the abdomen and pelvis were obtained.  CT dose lowering techniques were used, to include: automated exposure control, adjustment for patient size, and or use of   iterative reconstruction.    Findings:     Lung Bases: Lung bases are clear. No pleural effusion. Heart size is normal without pericardial effusion. Small hiatal hernia.    Liver and biliary system: The liver is normal in size and configuration without focal lesion. No intra or extrahepatic biliary ductal dilatation is noted. The gallbladder is absent.     Pancreas: The pancreas is unremarkable.     Spleen: The spleen is normal.    Adrenals: The adrenal glands are within normal limits.     Kidneys: The kidneys are  symmetric in size and enhancement and without hydronephrosis.    Gastrointestinal: Diverticulosis. Moderate stool is seen in the distal colon with diffuse fluid present in the more proximal portions of the colon. No abnormal small bowel dilatation.    Mesentery and retroperitoneum: No mesenteric or retroperitoneal adenopathy. No abnormal fluid collection, mass or free air.     Pelvis: The urinary bladder is moderately distended with a smooth contour. Rectum is normal. No free fluid. No deep pelvic or inguinal adenopathy.     Reproductive: No acute abnormality.    Body wall: Normal.    Bones: No acute osseous abnormality.      Impression:      Impression:   1. Diverticulosis.  2. Small hiatal hernia.    Electronically signed by:  Jarret Kee M.D.    11/26/2022 9:39 PM Mountain Time    CT Head Without Contrast [[id number]] Collected: 11/26/22 2124     Updated: 11/26/22 2328    Narrative:      EXAMINATION: CT HEAD WITHOUT CONTRAST    DATE: 11/26/2022 10:39 PM    INDICATION: Headache after TPA.    COMPARISON: CT head 11/26/2022    TECHNIQUE: Noncontrast CT imaging through the brain was performed in the axial plane. Coronal reformats were generated. CT dose lowering techniques were used, to include: automated exposure control, adjustment for patient size, and or use of iterative   reconstruction.    FINDINGS:    Intracranial contents:    The ventricles and sulci are at least moderately enlarged.  There is no midline shift or mass effect.  Residual contrast in the vessels and dura could potentially obscure small areas of subarachnoid or subdural hemorrhage. Allowing for this, no acute   intracranial hemorrhage is evident.    Patchy low densities in the cerebral white matter. Atherosclerotic calcification of the internal carotid arteries.    Bones and extracranial soft tissues:    The calvarium is intact. Mucosal thickening in the left maxillary sinus. Mild mucosal thickening in the ethmoid air cells.        Impression:         1. Residual contrast limits evaluation for intracranial hemorrhage. Allowing for this, no hemorrhage is identified.    2. Generalized brain volume loss and small vessel ischemic changes. Atherosclerosis.                Electronically signed by:  Bennett Aparicio M.D.    11/26/2022 9:26 PM Mountain Time    CT Angiogram Head w AI Analysis of LVO [[id number]] Collected: 11/26/22 1921     Updated: 11/26/22 2133    Narrative:      EXAMINATION: CTA HEAD AND NECK WITH CONTRAST    DATE: 11/26/2022 8:36 PM    INDICATION: Left-sided weakness    COMPARISON: CT head 11/26/2022    TECHNIQUE: Following the uneventful intravenous administration of 100 mL of Isovue-370 contrast material, axial arterial phase postcontrast images were obtained through the head and neck.  Coronal and sagittal reformatted images were created. AI analysis   of LVO was utilized. Additional 3-dimensional reformatted maximum intensity projection images were created. There were no immediate complications.  Stenosis based on NASCET criteria.  CT dose lowering techniques were used, to include: automated exposure   control, adjustment for patient size, and or use of iterative reconstruction.    FINDINGS:      NECK CTA:    Aorta:Visualized aorta is unremarkable.    Right carotid: No significant atherosclerotic disease.  No hemodynamically significant stenosis or dissection.    Left carotid: Mild atherosclerotic changes of the left carotid bifurcation.  No hemodynamically significant stenosis or dissection.    Vertebrals: Vertebral arteries are left dominant. No significant atherosclerotic disease.  No hemodynamically significant stenosis or dissection.    Osseous:No acute osseous abnormality.    Visualized lungs:Unremarkable.    Neck soft tissues:Unremarkable.      HEAD CTA:    Mild intracranial atherosclerotic irregularity.    TRENA:No hemodynamically significant stenosis or branch vessel occlusion. No aneurysm.    MCA:No hemodynamically significant stenosis  or branch vessel occlusion. No aneurysm.    PCA:No hemodynamically significant stenosis or branch vessel occlusion. No aneurysm.    Basilar:No hemodynamically significant stenosis or dissection.    Venous:No venous sinus thrombosis.        Impression:          1. No hemodynamically significant stenosis or dissection within the vasculature of the neck. Mild atherosclerotic changes.    2. Mild intracranial atherosclerotic irregularity. No branch vessel occlusion evident. No aneurysm identified.      Electronically signed by:  Yeyo Harper M.D.    11/26/2022 7:32 PM Mountain Time    CT CEREBRAL PERFUSION WITH & WITHOUT CONTRAST [[id number]] Collected: 11/26/22 1916     Updated: 11/26/22 2118    Narrative:      EXAMINATION: CT CEREBRAL PERFUSION W WO CONTRAST  11/26/2022 8:36 PM    INDICATIONS: Left-sided weakness    COMPARISON: CT head 11/26/2022    TECHNIQUE: CT perfusion imaging was obtained and presented on perfusion maps for review.. CT dose lowering techniques were used, to include: automated exposure control, adjustment for patient size, and/or use of iterative reconstruction.    FINDINGS:    No perfusion abnormality or mismatch evident. Some artifactual distortion of the perfusion series noted.      Impression:        No perfusion abnormality or mismatch to suggest acute ischemia or infarct by CT examination.           Electronically signed by:  Yeyo Harper M.D.    11/26/2022 7:17 PM Mountain Time        reviewed    ECG/EMG Results (most recent)     Procedure Component Value Units Date/Time    ECG 12 Lead Stroke Evaluation [[id number]] Collected: 11/26/22 2056     Updated: 11/26/22 2057     QT Interval 477 ms     Narrative:      HEART RATE= 61  bpm  RR Interval= 991  ms  WY Interval=   ms  P Horizontal Axis=   deg  P Front Axis=   deg  QRSD Interval= 95  ms  QT Interval= 477  ms  QRS Axis= 58  deg  T Wave Axis= 263  deg  - ABNORMAL ECG -  Atrial fibrillation  Abnormal T, consider ischemia, diffuse leads  No  previous ECG available for comparison  Electronically Signed By:   Date and Time of Study: 2022-11-26 20:56:16    SCANNED - TELEMETRY   [[id number]] Resulted: 11/26/22     Updated: 11/29/22 0619    SCANNED - TELEMETRY   [[id number]] Resulted: 11/26/22     Updated: 11/29/22 0619    Adult Transthoracic Echo Complete W/ Cont if Necessary Per Protocol [[id number]] Resulted: 11/29/22 0639     Updated: 11/29/22 0645     Target HR (85%) 119 bpm      Max. Pred. HR (100%) 140 bpm      LVIDd 4.1 cm      LVIDs 2.8 cm      IVSd 1.01 cm      LVPWd 0.84 cm      FS 31.2 %      IVS/LVPW 1.19 cm      ESV(cubed) 22.6 ml      LV Sys Vol (BSA corrected) 19.2 cm2      EDV(cubed) 69.3 ml      LV [place] Vol (BSA corrected) 49.1 cm2      LVOT area 2.8 cm2      LV mass(C)d 118.7 grams      LVOT diam 1.89 cm      EDV(MOD-sp4) 78.9 ml      ESV(MOD-sp4) 30.8 ml      SV(MOD-sp4) 48.1 ml      SI(MOD-sp4) 29.9 ml/m2      EF(MOD-sp4) 61.0 %      MV E max alesha 58.9 cm/sec      MV A max alesha 94.4 cm/sec      MV dec time 0.31 msec      MV E/A 0.62     SV(LVOT) 58.7 ml      RVIDd 2.30 cm      LV V1 max 115.8 cm/sec      LV V1 max PG 5.4 mmHg      LV V1 mean PG 2.6 mmHg      LV V1 VTI 20.8 cm      Ao pk alesha 144.8 cm/sec      Ao max PG 8.4 mmHg      Ao mean PG 4.4 mmHg      Ao V2 VTI 35.4 cm      APRIL(I,D) 1.66 cm2      MV max PG 4.4 mmHg      MV mean PG 1.26 mmHg      MV V2 VTI 24.2 cm      MVA(VTI) 2.42 cm2      MV dec slope 193.0 cm/sec2      TR max alesha 287.4 cm/sec      TR max PG 33.1 mmHg      RVSP(TR) 36.1 mmHg      RAP systole 3.0 mmHg      RV V1 max PG 2.18 mmHg      RV V1 max 73.8 cm/sec      RV V1 VTI 12.5 cm      PA V2 max 110.4 cm/sec      PA acc time 0.08 sec      PA pr(Accel) 44.7 mmHg      Ao root diam 2.8 cm      ACS 1.63 cm      BH CV ECHO SHUNT ASSESSMENT PERFORMED (HIDDEN SCRIPTING) 1     LA dimension (2D)  3.1 cm     Narrative:      •  Left ventricular systolic function is normal. Left ventricular ejection   fraction appears to be 56 -  60%.  •  Left ventricular diastolic function is consistent with (grade Ia w/high   LAP) impaired relaxation.  •  The right ventricular cavity is mildly dilated.  •  Saline test results are negative for right to left atrial level shunt.  •  Estimated right ventricular systolic pressure from tricuspid   regurgitation is normal (<35 mmHg).      SCANNED - TELEMETRY   [[id number]] Resulted: 11/26/22     Updated: 11/29/22 0818    SCANNED - TELEMETRY   [[id number]] Resulted: 11/26/22     Updated: 11/29/22 1309    SCANNED - TELEMETRY   [[id number]] Resulted: 11/26/22     Updated: 11/30/22 0645    SCANNED - TELEMETRY   [[id number]] Resulted: 11/26/22     Updated: 11/30/22 0645    SCANNED - TELEMETRY   [[id number]] Resulted: 11/26/22     Updated: 11/30/22 0724    SCANNED - TELEMETRY   [[id number]] Resulted: 11/26/22     Updated: 11/30/22 0836    ECG 12 Lead [[id number]] Collected: 12/01/22 0549     Updated: 12/01/22 0550     QT Interval 411 ms     Narrative:      HEART RATE= 60  bpm  RR Interval= 998  ms  NH Interval= 217  ms  P Horizontal Axis= -39  deg  P Front Axis=   deg  QRSD Interval= 73  ms  QT Interval= 411  ms  QRS Axis= 16  deg  T Wave Axis= -24  deg  - ABNORMAL ECG -  Atrial-paced complexes  Borderline prolonged NH interval  Low voltage, precordial leads  Borderline T abnormalities, diffuse leads  When compared with ECG of 26-Nov-2022 20:56:16,  Significant change in rhythm  New conduction abnormality  Significant axis, voltage or hypertrophy change  Electronically Signed By:   Date and Time of Study: 2022-12-01 05:49:44    SCANNED - TELEMETRY   [[id number]] Resulted: 11/26/22     Updated: 12/01/22 0644    SCANNED - TELEMETRY   [[id number]] Resulted: 11/26/22     Updated: 12/01/22 0646    SCANNED - TELEMETRY   [[id number]] Resulted: 11/26/22     Updated: 12/01/22 0703    EP/CRM Study [[id number]] Resulted: 11/30/22 1700     Updated: 12/01/22 0722    Narrative:      Stephanie Sol1940     Patient Care Team:  Celestina  Lior MORA MD as PCP - General (Internal Medicine)    : Karin Pedersen MD    Date of procedure: 11/30/2022    Procedure indications: Sick sinus syndrome, presence of a single-chamber   Biotronik pacemaker implanted on 3/16/2022.  Dyssynchronous V pacing,   pacemaker syndrome need for AV synchronization.    Procedure performed: Upgrade to a dual-chamber pacemaker with addition of   an atrial lead and generator change out to a dual-chamber system.    Procedure details    The patient was taken to the EP lab in a fasting state, the left shoulder   area was prepped and draped in the usual sterile fashion.  Sedation was   performed with IV Versed and fentanyl administered by the nurse under my   direct supervision.  Sedation duration was greater than 30 minutes.  Venogram was performed to ensure patency of the left subclavian vein,   after that incision was made close to the existing incision it was carried   down with blunt and sharp dissection to the pocket, the device was   exteriorized.  After that the left subclavian vein was accessed with a   micropuncture needle and a J-wire was inserted into the vein.  A 6 [place]   sheath was advanced over the wire.  Atrial lead was advanced into the   right atrium and it was actively fixated to the right atrial appendage.    It was tested with satisfactory performance.  At that point.  Rapid pacing   at 120 bpm from the atrium showed intact and steady AV conduction, with   narrow QRS.  The atrial lead was then sewn down to the pectoralis major   with Ethibond stitches.  The old device was then disconnected from the RV   lead, the pocket was irrigated with antibiotic solution, both leads were   then connected to the new dual-chamber pacemaker and the assembly was   introduced in the pocket after introduction in a TYRX antibiotic pouch.  The incision was then closed in layers of absorbable sutures and   Steri-Strips and appropriate bandaging was applied. The patient was  the   transferred to the recovery area in a stable condition.    New device information  Biotronik Edora 8 DR-T, serial #[id number]  RA lead: Biotronik Solia S 45, serial #[id number]    Previously implanted leads and device  RV lead: Biotronik Solia S 53, serial #[id number], date of implant   3/16/2022, reused  Device: Biotronik, serial #[id number], date of implant 3/16/2022.  Explanted    Parameters  RV: Threshold 0.8 V@0.4 ms     Sensing: 3.0 mV     Impedance: 525 ohms  RA: Threshold 0.5 V@0.4 ms     Sensin.8 mV     Impedance: 510 ohms    Device settings: DDD 60/130    MRI: Compatible    Complications: None    Blood loss: 20 cc    Conclusion: Successful upgrade of a single-chamber pacemaker to a   dual-chamber pacemaker system for sick sinus syndrome, dyssynchronous AV   pacing and pacemaker syndrome.         SCANNED - TELEMETRY   [[id number]] Resulted: 22     Updated: 22 0758    SCANNED - TELEMETRY   [[id number]] Resulted: 22     Updated: 22 0758    SCANNED - TELEMETRY   [[id number]] Resulted: 22     Updated: 22 0835    SCANNED - TELEMETRY   [[id number]] Resulted: 22     Updated: 22 0841        reviewed    Assessment & Plan     Reportedly had atrial fibrillation, no evidence of this, does not want anticoagulation  EKG from  demonstrates narrow complex rhythm concerning for junctional escape with wandering atrial pacemaker with underlying atrial bradycardia likely sick sinus syndrome with accelerated junctional rhythm 60s and AV dyssynchrony  Telemetry demonstrates V pacing at a rate of 50, underlying atrial activity slower with variable P wave morphology    Status post atrial lead placement now with resynchronization of AV conduction atrially paced ventricularly sensed rhythm  Adequate blood pressures  No acute events overnight  Device interrogation with normal functioning device with adequate capture and sensitivity    2D echo demonstrates preserved EF    Do not  think she needs systemic anticoagulation at this point  Not a contraindication for anticoagulation if neurology thinks this is warranted from a stroke standpoint, would wait at least 48 hours after device placement for any starting of anticoagulation  Okay for aspirin from CV standpoint  No wall motion abnormality no ischemic evaluation pending or needed  Known LVH which would explain nonspecific repolarization abnormalities    Avoid hypotension    Supportive care from CV standpoint  further recommendation follow findings clinical course device interrogation    Can go home from CV standpoint when able from neurologic and medicine standpoint    .  35-minute spent face-to-face with the patient as well as in coordination, documentation, chart review, discussion with primary consultants and nursing staff      I discussed the patient's findings and my recommendations with patient and staff    Jarret Iraheta MD  12/01/22  09:32 EST

## 2022-12-01 NOTE — PLAN OF CARE
Problem: Fall Injury Risk  Goal: Absence of Fall and Fall-Related Injury  Outcome: Ongoing, Progressing  Intervention: Identify and Manage Contributors  Recent Flowsheet Documentation  Taken 12/1/2022 1000 by Lory Escalante RN  Medication Review/Management: medications reviewed  Taken 12/1/2022 0800 by Lory Escalante RN  Medication Review/Management: medications reviewed  Intervention: Promote Injury-Free Environment  Recent Flowsheet Documentation  Taken 12/1/2022 1000 by Lory Escalante RN  Safety Promotion/Fall Prevention:   safety round/check completed   activity supervised  Taken 12/1/2022 0800 by Lory Escalante RN  Safety Promotion/Fall Prevention:   activity supervised   safety round/check completed     Problem: Skin Injury Risk Increased  Goal: Skin Health and Integrity  Outcome: Ongoing, Progressing  Intervention: Optimize Skin Protection  Recent Flowsheet Documentation  Taken 12/1/2022 1000 by Lory Escalante RN  Head of Bed (HOB) Positioning: HOB at 30-45 degrees   Goal Outcome Evaluation:

## 2022-12-01 NOTE — OUTREACH NOTE
Prep Survey    Flowsheet Row Responses   [facility] facility patient discharged from? [facility]   Is LACE score < 7 ? No   Emergency Room discharge w/ pulse ox? No   Eligibility Readm Mgmt   Discharge diagnosis Acute left-sided weakness, rule out CVALeft facial droopAtrial fibrillation,   Does the patient have one of the following disease processes/diagnoses(primary or secondary)? Other   Does the patient have Home health ordered? Yes   What is the Home health agency?  [facility]    Is there a DME ordered? No   Prep survey completed? Yes          ISABELLE FLEMING - Registered Nurse

## 2022-12-01 NOTE — THERAPY RE-EVALUATION
Patient Name: Stephanie Sol  : 1940    MRN: [id number]                              Today's Date: 2022       Admit Date: 2022    Visit Dx:     ICD-10-CM ICD-9-CM   1. Acute left-sided weakness  R53.1 728.87   2. Slurred speech  R47.81 784.59   3. Ischemic stroke (HCC)  I63.9 434.91   4. Sick sinus syndrome (HCC)  I49.5 427.81   5. Primary osteoarthritis involving multiple joints  M15.9 715.98     Patient Active Problem List   Diagnosis   • Acute left-sided weakness   • Anxiety disorder, unspecified   • Cellulitis of left lower limb   • Encounter for surgical aftercare following surgery on the circulatory system   • Gout, unspecified   • Mixed hyperlipidemia   • Major depressive disorder, single episode, unspecified   • Other lack of coordination   • Presence of cardiac pacemaker   • Sick sinus syndrome (HCC)   • Unspecified osteoarthritis, unspecified site   • Generalized muscle weakness   • Primary hypertension   • Acute kidney injury (HCC)   • Physical debility   • Narrow complex tachycardia (HCC)   • Transient ischemic attack (TIA)     Past Medical History:   Diagnosis Date   • Essential hypertension 2022   • Gout, unspecified 3/18/2022   • Major depressive disorder, single episode, unspecified 3/18/2022   • Presence of cardiac pacemaker 3/18/2022   • Sick sinus syndrome (HCC) 3/18/2022     Past Surgical History:   Procedure Laterality Date   • CARDIAC ELECTROPHYSIOLOGY PROCEDURE N/A 2022    Procedure: Biv pacemaker upgradeBiotronik;  Surgeon: Karin Pedersen MD;  Location: [facility] INVASIVE LOCATION;  Service: Cardiovascular;  Laterality: N/A;      General Information     Row Name 22 1322          OT Time and Intention    Document Type re-evaluation  -LS     Mode of Treatment occupational therapy  -LS     Row Name 22 132          General Information    Patient Profile Reviewed yes  -LS     Prior Level of Function independent:;ADL's;all household  mobility;community mobility  -     Existing Precautions/Restrictions fall;pacemaker;cardiac  -     Barriers to Rehab previous functional deficit  -     Row Name 12/01/22 1322          Living Environment    People in Home spouse  -     Row Name 12/01/22 1322          Home Main Entrance    Number of Stairs, Main Entrance five  -     Stair Railings, Main Entrance railings safe and in good condition  -     Row Name 12/01/22 1322          Stairs Within Home, Primary    Number of Stairs, Within Home, Primary none  -     Row Name 12/01/22 1322          Cognition    Orientation Status (Cognition) oriented to;person;place;situation  -     Row Name 12/01/22 1322          Safety Issues, Functional Mobility    Impairments Affecting Function (Mobility) balance;strength;endurance/activity tolerance  -           User Key  (r) = Recorded By, (t) = Taken By, (c) = Cosigned By    Initials Name Provider Type     Harrison Perez OT Occupational Therapist                 Mobility/ADL's     Row Name 12/01/22 1324          Bed Mobility    Bed Mobility rolling right;supine-sit;sit-supine  -     Rolling Right [place] (Bed Mobility) minimum assist (75% patient effort)  -     Supine-Sit [place] (Bed Mobility) minimum assist (75% patient effort)  -     Sit-Supine [place] (Bed Mobility) verbal cues  -     Comment, (Bed Mobility) --  -     Row Name 12/01/22 1324          Transfers    Transfers sit-stand transfer  -     Row Name 12/01/22 1324          Sit-Stand Transfer    Sit-Stand [place] (Transfers) contact guard  -     Row Name 12/01/22 1324          Activities of Daily Living    BADL Assessment/Intervention upper body dressing;lower body dressing  -     Row Name 12/01/22 1324          Upper Body Dressing Assessment/Training    [place] Level (Upper Body Dressing) minimum assist (75% patient effort);moderate assist (50% patient effort)  -     Row Name 12/01/22 1324          Lower  Body Dressing Assessment/Training    [place] Level (Lower Body Dressing) moderate assist (50% patient effort);maximum assist (25% patient effort)  -LS           User Key  (r) = Recorded By, (t) = Taken By, (c) = Cosigned By    Initials Name Provider Type    LS Harrison Perez OT Occupational Therapist               Obj/Interventions     Row Name 12/01/22 1326          Sensory Assessment (Somatosensory)    Sensory Assessment (Somatosensory) sensation intact  -LS     Row Name 12/01/22 1326          Range of Motion Comprehensive    Comment, General Range of Motion R side WFL, L side NT 2/2 pacemaker precautions  -LS     Row Name 12/01/22 1326          Strength Comprehensive (MMT)    Comment, General Manual Muscle Testing (MMT) Assessment RUE grossly 3/5, LUE NT 2/2 pacemaker precautions  -LS     Row Name 12/01/22 1326          Balance    Balance Assessment sitting static balance;sitting dynamic balance;sit to stand dynamic balance;standing static balance;standing dynamic balance  -LS     Static Sitting Balance independent  -LS     Dynamic Sitting Balance independent  -LS     Position, Sitting Balance sitting in chair  -LS     Sit to Stand Dynamic Balance contact guard  -LS     Static Standing Balance contact guard  -LS     Dynamic Standing Balance contact guard;minimal assist  -LS     Position/Device Used, Standing Balance cane, straight  -LS     Balance Interventions sitting;standing;sit to stand;supported;static;dynamic;minimal challenge;occupation based/functional task  -LS           User Key  (r) = Recorded By, (t) = Taken By, (c) = Cosigned By    Initials Name Provider Type    LS Harrison Perez OT Occupational Therapist               Goals/Plan     Row Name 12/01/22 1343          Bed Mobility Goal 1 (OT)    Activity/Assistive Device (Bed Mobility Goal 1, OT) bed mobility activities, all  -LS     [place] Level/Cues Needed (Bed Mobility Goal 1, OT) modified independence  -LS     Time Frame (Bed Mobility  Goal 1, OT) long term goal (LTG);2 weeks  -LS     Row Name 12/01/22 1343          Dressing Goal 1 (OT)    Activity/Device (Dressing Goal 1, OT) lower body dressing  -LS     [place]/Cues Needed (Dressing Goal 1, OT) minimum assist (75% or more patient effort)  -LS     Time Frame (Dressing Goal 1, OT) 2 weeks  -LS     Progress/Outcome (Dressing Goal 1, OT) good progress toward goal  -LS     Row Name 12/01/22 1343          Toileting Goal 1 (OT)    Activity/Device (Toileting Goal 1, OT) toileting skills, all  -LS     [place] Level/Cues Needed (Toileting Goal 1, OT) minimum assist (75% or more patient effort)  -LS     Time Frame (Toileting Goal 1, OT) 2 weeks  -LS     Progress/Outcome (Toileting Goal 1, OT) good progress toward goal  -LS     Row Name 12/01/22 1343          Therapy Assessment/Plan (OT)    Planned Therapy Interventions (OT) activity tolerance training;BADL retraining;functional balance retraining;IADL retraining;occupation/activity based interventions;ROM/therapeutic exercise;strengthening exercise;transfer/mobility retraining;neuromuscular control/coordination retraining  -LS           User Key  (r) = Recorded By, (t) = Taken By, (c) = Cosigned By    Initials Name Provider Type    LS Harrison Perez OT Occupational Therapist               Clinical Impression     Row Name 12/01/22 1322          Pain Assessment    Pretreatment Pain Rating 0/10 - no pain  -LS     Posttreatment Pain Rating 0/10 - no pain  -LS     Row Name 12/01/22 1327          Plan of Care Review    Plan of Care Reviewed With patient;spouse  -LS     Outcome Evaluation Patient re-evaluated this date secondary to pacemaker revisions made yesterday. Patient is thus in pacemaker precautions currently with LUE in a sling, for which she will require Max A to don. Patient in chair upon arrival, requiring Max A for lower body dressing (donning slippers). She was then able to scoot forward in the chair and come to standing with CGA.  Posterior lean noted to begin, but pt able to correct with cues. With HHA, patient ambulated out of room and into hallway, and was taken to stairs to simulate home environment. Using rail, she was able to navigate 4 stairs albeit slowly. Patient then trialled walking with a cane, requiring min assist and noted to have shortened stride length and decreased balance. Due to now having pacemaker precautions and nonuse of LUE, patient will require greater assistance with ADL care. OT will follow, recommending acute IP rehab at d/c  -     Row Name 12/01/22 1324          Therapy Assessment/Plan (OT)    Rehab Potential (OT) good, to achieve stated therapy goals  -     Criteria for Skilled Therapeutic Interventions Met (OT) yes;meets criteria;skilled treatment is necessary  -     Therapy Frequency (OT) 5 times/wk  -     Row Name 12/01/22 1328          Therapy Plan Review/Discharge Plan (OT)    Anticipated Discharge Disposition (OT) inpatient rehabilitation facility  -     Row Name 12/01/22 1325          Vital Signs    Post Systolic BP Rehab 142  -LS     Post Treatment Diastolic BP 62  -LS     Pretreatment Heart Rate (beats/min) 60  -LS     Intratreatment Heart Rate (beats/min) 105  -LS     Posttreatment Heart Rate (beats/min) 65  -LS     O2 Delivery Pre Treatment room air  -LS     O2 Delivery Intra Treatment room air  -LS     O2 Delivery Post Treatment room air  -LS     Pre Patient Position Sitting  -LS     Intra Patient Position Standing  -LS     Post Patient Position Sitting  -     Row Name 12/01/22 1322          Positioning and Restraints    Pre-Treatment Position sitting in chair/recliner  -LS     Post Treatment Position chair  -LS     In Chair notified nsg;reclined;call light within reach;encouraged to call for assist;exit alarm on;with family/caregiver  -           User Key  (r) = Recorded By, (t) = Taken By, (c) = Cosigned By    Initials Name Provider Type    Harrison Boyd, ERIN Occupational Therapist                Outcome Measures     Row Name 12/01/22 1343          Modified [place] Scale    Modified [place] Scale 4 - Moderately severe disability.  Unable to walk without assistance, and unable to attend to own bodily needs without assistance.  -LS     Row Name 12/01/22 1343          Functional Assessment    Outcome Measure Options Modified [place]  -LS           User Key  (r) = Recorded By, (t) = Taken By, (c) = Cosigned By    Initials Name Provider Type    Harrison Boyd OT Occupational Therapist                Occupational Therapy Education     Title: PT OT SLP Therapies (Done)     Topic: Occupational Therapy (Done)     Point: ADL training (Done)     Description:   Instruct learner(s) on proper safety adaptation and remediation techniques during self care or transfers.   Instruct in proper use of assistive devices.              Learning Progress Summary           Patient Acceptance, E, VU by NE at 12/1/2022 1114    Acceptance, E, NR by NE at 11/30/2022 1547    Eager, E, NR by NE at 11/29/2022 1147    Acceptance, E, VU by ED at 11/29/2022 0253    Acceptance, E,TB, VU,NR by DT at 11/28/2022 1205   Family Acceptance, E,TB, VU,NR by DT at 11/28/2022 1205                   Point: Home exercise program (Done)     Description:   Instruct learner(s) on appropriate technique for monitoring, assisting and/or progressing therapeutic exercises/activities.              Learning Progress Summary           Patient Acceptance, E, VU by NE at 12/1/2022 1114    Acceptance, E, NR by NE at 11/30/2022 1547    Eager, E, NR by NE at 11/29/2022 1147    Acceptance, E, VU by ED at 11/29/2022 0253                   Point: Precautions (Done)     Description:   Instruct learner(s) on prescribed precautions during self-care and functional transfers.              Learning Progress Summary           Patient Acceptance, E, VU by NE at 12/1/2022 1114    Acceptance, E, NR by NE at 11/30/2022 1547    Eager, E, NR by NE at 11/29/2022 1147     Acceptance, E, VU by ED at 11/29/2022 0253    Acceptance, E,TB, VU,NR by DT at 11/28/2022 1205   Family Acceptance, E,TB, VU,NR by DT at 11/28/2022 1205                   Point: Body mechanics (Done)     Description:   Instruct learner(s) on proper positioning and spine alignment during self-care, functional mobility activities and/or exercises.              Learning Progress Summary           Patient Acceptance, E, VU by NE at 12/1/2022 1114    Acceptance, E, NR by NE at 11/30/2022 1547    Eager, E, NR by NE at 11/29/2022 1147    Acceptance, E, VU by ED at 11/29/2022 0253    Acceptance, E,TB, VU,NR by DT at 11/28/2022 1205   Family Acceptance, E,TB, VU,NR by DT at 11/28/2022 1205                               User Key     Initials Effective Dates Name Provider Type Discipline    DT 11/03/22 -  Beatriz Kinney, OT Occupational Therapist OT    ED 09/30/21 -  Marcy Jc, RN Registered Nurse Nurse    NE 02/24/22 -  Lory Escalante RN Registered Nurse Nurse              OT Recommendation and Plan  Planned Therapy Interventions (OT): activity tolerance training, BADL retraining, functional balance retraining, IADL retraining, occupation/activity based interventions, ROM/therapeutic exercise, strengthening exercise, transfer/mobility retraining, neuromuscular control/coordination retraining  Therapy Frequency (OT): 5 times/wk  Plan of Care Review  Plan of Care Reviewed With: patient, spouse  Outcome Evaluation: Patient re-evaluated this date secondary to pacemaker revisions made yesterday. Patient is thus in pacemaker precautions currently with LUE in a sling, for which she will require Max A to don. Patient in chair upon arrival, requiring Max A for lower body dressing (donning slippers). She was then able to scoot forward in the chair and come to standing with CGA. Posterior lean noted to begin, but pt able to correct with cues. With HHA, patient ambulated out of room and into hallway, and was taken to  stairs to simulate home environment. Using rail, she was able to navigate 4 stairs albeit slowly. Patient then trialled walking with a cane, requiring min assist and noted to have shortened stride length and decreased balance. Due to now having pacemaker precautions and nonuse of LUE, patient will require greater assistance with ADL care. OT will follow, recommending acute IP rehab at d/c     Time Calculation:    Time Calculation- OT     Row Name 12/01/22 1346             Time Calculation- OT    OT Start Time 0916  -      OT Stop Time 0949  -      OT Time Calculation (min) 33 min  -LS      Total Timed Code Minutes- OT 10 minute(s)  -LS      OT Received On 12/01/22  -      OT - Next Appointment 12/02/22  -      OT Goal Re-Cert Due Date 12/15/22  -         Timed Charges    17365 - OT Self Care/Mgmt Minutes 10  -LS         Untimed Charges    OT Eval/Re-eval Minutes 23  -LS         Total Minutes    Timed Charges Total Minutes 10  -LS      Untimed Charges Total Minutes 23  -LS       Total Minutes 33  -LS            User Key  (r) = Recorded By, (t) = Taken By, (c) = Cosigned By    Initials Name Provider Type     Harrison Perez OT Occupational Therapist              Therapy Charges for Today     Code Description Service Date Service Provider Modifiers Qty    [id number] HC OT RE-EVAL 2 12/1/2022 Harrison Perez OT GO 1    [id number]  OT SELF CARE/MGMT/TRAIN EA 15 MIN 12/1/2022 Harrison Perez OT GO 1               Harrison Perez OT  12/1/2022

## 2022-12-01 NOTE — CASE MANAGEMENT/SOCIAL WORK
Continued Stay Note   [facility]     Patient Name: Stephanie Sol  MRN: [id number]  Today's Date: 12/1/2022    Admit Date: 11/26/2022    Plan: SIR pending acceptance; No pre-cert needed.  No PASRR   Discharge Plan     Row Name 12/01/22 1049       Plan    Plan SIR pending acceptance; No pre-cert needed.  No PASRR           Expected Discharge Date and Time     Expected Discharge Date Expected Discharge Time    Dec 1, 2022             Key Gray RN

## 2022-12-01 NOTE — DISCHARGE PLACEMENT REQUEST
"Stephanie Stringer (82 y.o. Female)     Date of Birth   1940    Social Security Number       Address   [address] [place] [address]    Home Phone   [phone]    MRN   [id number]       Amish   Congregational    Marital Status                               Admission Date   11/26/22    Admission Type   Emergency    Admitting Provider   Prince Maza MD    Attending Provider   Prince Maza MD    Department, Room/Bed   [facility] NEURO HEART, 273/1       Discharge Date       Discharge Disposition   Home-Health Care [facility]    Discharge Destination                               Attending Provider: Prince Maza MD    Allergies: No Known Allergies    Isolation: None   Infection: None   Code Status: CPR    Ht: 157.5 cm (62\")   Wt: 68 kg (149 lb 14.6 oz)    Admission Cmt: None   Principal Problem: Acute left-sided weakness [R53.1]                 Active Insurance as of 11/26/2022     Primary Coverage     Payor Plan Insurance Group Employer/Plan Group    MEDICARE MEDICARE A & B      Payor Plan Address Payor Plan Phone Number Payor Plan Fax Number Effective Dates    [address] [phone]  7/1/2007 - None Entered    [place] [address]       Subscriber Name Subscriber Birth Date Member ID       STEPHANIE STRINGER 1940 [id number]                 Emergency Contacts      (Rel.) Home Phone Work Phone Mobile Phone    JINNYKINGS URIAS (Spouse) [phone] -- --              "

## 2022-12-01 NOTE — DISCHARGE PLACEMENT REQUEST
"Stephanie Stringer (82 y.o. Female)     Date of Birth   1940    Social Security Number       Address   [address] [place] [address]    Home Phone   [phone]    MRN   [id number]       Restorationism   [employer]ybeterian    Marital Status                               Admission Date   11/26/22    Admission Type   Emergency    Admitting Provider   Prince Maza MD    Attending Provider   Prince Maza MD    Department, Room/Bed   [facility] NEURO HEART, 273/1       Discharge Date       Discharge Disposition   [place] [facility]    Discharge Destination                               Attending Provider: Prince Maza MD    Allergies: No Known Allergies    Isolation: None   Infection: None   Code Status: CPR    Ht: 157.5 cm (62\")   Wt: 68 kg (149 lb 14.6 oz)    Admission Cmt: None   Principal Problem: Acute left-sided weakness [R53.1]                 Active Insurance as of 11/26/2022     Primary Coverage     Payor Plan Insurance Group Employer/Plan Group    MEDICARE MEDICARE A & B      Payor Plan Address Payor Plan Phone Number Payor Plan Fax Number Effective Dates    [address] [phone]  7/1/2007 - None Entered    [place] [address]       Subscriber Name Subscriber Birth Date Member ID       STEPHANIE STRINGER 1940 [id number]                 Emergency Contacts      (Rel.) Home Phone Work Phone Mobile Phone    JINNYKINGS URIAS (Spouse) [phone] -- --              "

## 2022-12-02 NOTE — CASE MANAGEMENT/SOCIAL WORK
Case Management Discharge Note           Provided Post Acute Provider List?: Yes  Post Acute Provider List: Nursing Home, Home Health, Inpatient Rehab  Provided Post Acute Provider Quality & Resource List?: Yes  Post Acute Provider Quality and Resource List: Home Health, Inpatient Rehab  Delivered To: Patient, Support Person  Support Person: spouse  Method of Delivery: In person    Selected Continued Care - Discharged on 12/1/2022 Admission date: 11/26/2022 - Discharge disposition: Home-Health Care [facility]         Transportation Services  Private: Car    Final Discharge Disposition Code: 62 - inpatient rehab facility

## 2022-12-05 ENCOUNTER — READMISSION MANAGEMENT (OUTPATIENT)
Dept: CALL CENTER | Facility: HOSPITAL | Age: 82
End: 2022-12-05

## 2022-12-05 LAB — QT INTERVAL: 477 MS

## 2022-12-05 NOTE — OUTREACH NOTE
Medical Week 1 Survey    Flowsheet Row Responses   [facility] facility patient discharged from? [facility]   Does the patient have one of the following disease processes/diagnoses(primary or secondary)? Other   Week 1 attempt successful? No   Unsuccessful attempts Attempt 1          ELIZABETH Younger Registered Nurse

## 2022-12-08 ENCOUNTER — READMISSION MANAGEMENT (OUTPATIENT)
Dept: CALL CENTER | Facility: HOSPITAL | Age: 82
End: 2022-12-08

## 2022-12-08 NOTE — OUTREACH NOTE
Medical Week 1 Survey    Flowsheet Row Responses   [facility] facility patient discharged from? [facility]   Does the patient have one of the following disease processes/diagnoses(primary or secondary)? Other   Week 1 attempt successful? No   Unsuccessful attempts Attempt 2          RADHA ORTEGA - Registered Nurse

## 2022-12-12 ENCOUNTER — READMISSION MANAGEMENT (OUTPATIENT)
Dept: CALL CENTER | Facility: HOSPITAL | Age: 82
End: 2022-12-12

## 2022-12-15 LAB — QT INTERVAL: 411 MS

## 2022-12-21 ENCOUNTER — READMISSION MANAGEMENT (OUTPATIENT)
Dept: CALL CENTER | Facility: HOSPITAL | Age: 82
End: 2022-12-21

## 2022-12-21 NOTE — OUTREACH NOTE
Medical Week 2 Survey    Flowsheet Row Responses   [facility] facility patient discharged from? [facility]   Does the patient have one of the following disease processes/diagnoses(primary or secondary)? Other   Week 2 attempt successful? No   Unsuccessful attempts Attempt 1          YUE ESCOBAR - Registered Nurse

## 2023-03-01 PROCEDURE — 93294 REM INTERROG EVL PM/LDLS PM: CPT | Performed by: INTERNAL MEDICINE

## 2023-03-01 PROCEDURE — 93296 REM INTERROG EVL PM/IDS: CPT | Performed by: INTERNAL MEDICINE

## 2024-03-26 ENCOUNTER — APPOINTMENT (OUTPATIENT)
Dept: GENERAL RADIOLOGY | Facility: HOSPITAL | Age: 84
DRG: 683 | End: 2024-03-26
Payer: MEDICARE

## 2024-03-26 ENCOUNTER — APPOINTMENT (OUTPATIENT)
Dept: CT IMAGING | Facility: HOSPITAL | Age: 84
DRG: 683 | End: 2024-03-26
Payer: MEDICARE

## 2024-03-26 ENCOUNTER — HOSPITAL ENCOUNTER (INPATIENT)
Facility: HOSPITAL | Age: 84
LOS: 4 days | Discharge: NURSING, REHAB OR LONG-TERM CARE | DRG: 683 | End: 2024-03-31
Attending: EMERGENCY MEDICINE | Admitting: INTERNAL MEDICINE
Payer: MEDICARE

## 2024-03-26 DIAGNOSIS — N17.9 ACUTE KIDNEY INJURY: ICD-10-CM

## 2024-03-26 DIAGNOSIS — R41.82 ALTERED MENTAL STATUS, UNSPECIFIED ALTERED MENTAL STATUS TYPE: Primary | ICD-10-CM

## 2024-03-26 LAB
ALBUMIN SERPL-MCNC: 4.4 G/DL (ref 3.5–5.2)
ALBUMIN/GLOB SERPL: 1.5 G/DL
ALP SERPL-CCNC: 106 U/L (ref 39–117)
ALT SERPL W P-5'-P-CCNC: 20 U/L (ref 1–33)
ANION GAP SERPL CALCULATED.3IONS-SCNC: 13 MMOL/L (ref 5–15)
AST SERPL-CCNC: 29 U/L (ref 1–32)
BASOPHILS # BLD AUTO: 0.02 10*3/MM3 (ref 0–0.2)
BASOPHILS NFR BLD AUTO: 0.2 % (ref 0–1.5)
BILIRUB SERPL-MCNC: 0.2 MG/DL (ref 0–1.2)
BILIRUB UR QL STRIP: NEGATIVE
BUN SERPL-MCNC: 36 MG/DL (ref 8–23)
BUN/CREAT SERPL: 17.1 (ref 7–25)
CALCIUM SPEC-SCNC: 9.7 MG/DL (ref 8.6–10.5)
CHLORIDE SERPL-SCNC: 102 MMOL/L (ref 98–107)
CLARITY UR: CLEAR
CO2 SERPL-SCNC: 20 MMOL/L (ref 22–29)
COLOR UR: YELLOW
CREAT SERPL-MCNC: 2.1 MG/DL (ref 0.57–1)
DEPRECATED RDW RBC AUTO: 56.1 FL (ref 37–54)
EGFRCR SERPLBLD CKD-EPI 2021: 23.3 ML/MIN/1.73
EOSINOPHIL # BLD AUTO: 0.1 10*3/MM3 (ref 0–0.4)
EOSINOPHIL NFR BLD AUTO: 1.2 % (ref 0.3–6.2)
ERYTHROCYTE [DISTWIDTH] IN BLOOD BY AUTOMATED COUNT: 15.8 % (ref 12.3–15.4)
FLUAV SUBTYP SPEC NAA+PROBE: NOT DETECTED
FLUBV RNA ISLT QL NAA+PROBE: NOT DETECTED
GLOBULIN UR ELPH-MCNC: 3 GM/DL
GLUCOSE SERPL-MCNC: 133 MG/DL (ref 65–99)
GLUCOSE UR STRIP-MCNC: NEGATIVE MG/DL
HCT VFR BLD AUTO: 42.5 % (ref 34–46.6)
HGB BLD-MCNC: 13.2 G/DL (ref 12–15.9)
HGB UR QL STRIP.AUTO: NEGATIVE
HOLD SPECIMEN: NORMAL
IMM GRANULOCYTES # BLD AUTO: 0.07 10*3/MM3 (ref 0–0.05)
IMM GRANULOCYTES NFR BLD AUTO: 0.8 % (ref 0–0.5)
KETONES UR QL STRIP: NEGATIVE
LEUKOCYTE ESTERASE UR QL STRIP.AUTO: NEGATIVE
LYMPHOCYTES # BLD AUTO: 1.24 10*3/MM3 (ref 0.7–3.1)
LYMPHOCYTES NFR BLD AUTO: 14.5 % (ref 19.6–45.3)
MCH RBC QN AUTO: 30 PG (ref 26.6–33)
MCHC RBC AUTO-ENTMCNC: 31.1 G/DL (ref 31.5–35.7)
MCV RBC AUTO: 96.6 FL (ref 79–97)
MONOCYTES # BLD AUTO: 0.54 10*3/MM3 (ref 0.1–0.9)
MONOCYTES NFR BLD AUTO: 6.3 % (ref 5–12)
NEUTROPHILS NFR BLD AUTO: 6.59 10*3/MM3 (ref 1.7–7)
NEUTROPHILS NFR BLD AUTO: 77 % (ref 42.7–76)
NITRITE UR QL STRIP: NEGATIVE
NRBC BLD AUTO-RTO: 0 /100 WBC (ref 0–0.2)
PH UR STRIP.AUTO: <=5 [PH] (ref 5–8)
PLATELET # BLD AUTO: 179 10*3/MM3 (ref 140–450)
PMV BLD AUTO: 10.7 FL (ref 6–12)
POTASSIUM SERPL-SCNC: 5.6 MMOL/L (ref 3.5–5.2)
PROT SERPL-MCNC: 7.4 G/DL (ref 6–8.5)
PROT UR QL STRIP: ABNORMAL
RBC # BLD AUTO: 4.4 10*6/MM3 (ref 3.77–5.28)
SARS-COV-2 RNA RESP QL NAA+PROBE: NOT DETECTED
SODIUM SERPL-SCNC: 135 MMOL/L (ref 136–145)
SP GR UR STRIP: 1.02 (ref 1–1.03)
UROBILINOGEN UR QL STRIP: ABNORMAL
WBC NRBC COR # BLD AUTO: 8.56 10*3/MM3 (ref 3.4–10.8)
WHOLE BLOOD HOLD COAG: NORMAL

## 2024-03-26 PROCEDURE — 87636 SARSCOV2 & INF A&B AMP PRB: CPT | Performed by: EMERGENCY MEDICINE

## 2024-03-26 PROCEDURE — P9612 CATHETERIZE FOR URINE SPEC: HCPCS

## 2024-03-26 PROCEDURE — G0378 HOSPITAL OBSERVATION PER HR: HCPCS

## 2024-03-26 PROCEDURE — 85025 COMPLETE CBC W/AUTO DIFF WBC: CPT | Performed by: EMERGENCY MEDICINE

## 2024-03-26 PROCEDURE — 80053 COMPREHEN METABOLIC PANEL: CPT | Performed by: EMERGENCY MEDICINE

## 2024-03-26 PROCEDURE — 25810000003 SODIUM CHLORIDE 0.9 % SOLUTION

## 2024-03-26 PROCEDURE — 93005 ELECTROCARDIOGRAM TRACING: CPT | Performed by: EMERGENCY MEDICINE

## 2024-03-26 PROCEDURE — 25010000002 METHYLPREDNISOLONE PER 125 MG

## 2024-03-26 PROCEDURE — 36415 COLL VENOUS BLD VENIPUNCTURE: CPT

## 2024-03-26 PROCEDURE — 81003 URINALYSIS AUTO W/O SCOPE: CPT | Performed by: EMERGENCY MEDICINE

## 2024-03-26 PROCEDURE — 99285 EMERGENCY DEPT VISIT HI MDM: CPT

## 2024-03-26 PROCEDURE — 71045 X-RAY EXAM CHEST 1 VIEW: CPT

## 2024-03-26 PROCEDURE — 70450 CT HEAD/BRAIN W/O DYE: CPT

## 2024-03-26 RX ORDER — POLYETHYLENE GLYCOL 3350 17 G/17G
17 POWDER, FOR SOLUTION ORAL DAILY PRN
Status: DISCONTINUED | OUTPATIENT
Start: 2024-03-26 | End: 2024-03-31 | Stop reason: HOSPADM

## 2024-03-26 RX ORDER — SODIUM CHLORIDE 0.9 % (FLUSH) 0.9 %
10 SYRINGE (ML) INJECTION EVERY 12 HOURS SCHEDULED
Status: DISCONTINUED | OUTPATIENT
Start: 2024-03-26 | End: 2024-03-31 | Stop reason: HOSPADM

## 2024-03-26 RX ORDER — ONDANSETRON 2 MG/ML
4 INJECTION INTRAMUSCULAR; INTRAVENOUS EVERY 6 HOURS PRN
Status: DISCONTINUED | OUTPATIENT
Start: 2024-03-26 | End: 2024-03-31 | Stop reason: HOSPADM

## 2024-03-26 RX ORDER — ALLOPURINOL 100 MG/1
100 TABLET ORAL DAILY
Status: DISCONTINUED | OUTPATIENT
Start: 2024-03-27 | End: 2024-03-31 | Stop reason: HOSPADM

## 2024-03-26 RX ORDER — SODIUM CHLORIDE 0.9 % (FLUSH) 0.9 %
10 SYRINGE (ML) INJECTION AS NEEDED
Status: DISCONTINUED | OUTPATIENT
Start: 2024-03-26 | End: 2024-03-31 | Stop reason: HOSPADM

## 2024-03-26 RX ORDER — LISINOPRIL 20 MG/1
20 TABLET ORAL
Status: DISCONTINUED | OUTPATIENT
Start: 2024-03-27 | End: 2024-03-31 | Stop reason: HOSPADM

## 2024-03-26 RX ORDER — METHYLPREDNISOLONE SODIUM SUCCINATE 125 MG/2ML
125 INJECTION, POWDER, LYOPHILIZED, FOR SOLUTION INTRAMUSCULAR; INTRAVENOUS ONCE
Status: COMPLETED | OUTPATIENT
Start: 2024-03-26 | End: 2024-03-26

## 2024-03-26 RX ORDER — ACETAMINOPHEN 325 MG/1
650 TABLET ORAL EVERY 4 HOURS PRN
Status: DISCONTINUED | OUTPATIENT
Start: 2024-03-26 | End: 2024-03-31 | Stop reason: HOSPADM

## 2024-03-26 RX ORDER — ACETAMINOPHEN 650 MG/1
650 SUPPOSITORY RECTAL EVERY 4 HOURS PRN
Status: DISCONTINUED | OUTPATIENT
Start: 2024-03-26 | End: 2024-03-31 | Stop reason: HOSPADM

## 2024-03-26 RX ORDER — ESCITALOPRAM OXALATE 10 MG/1
10 TABLET ORAL DAILY
Status: DISCONTINUED | OUTPATIENT
Start: 2024-03-27 | End: 2024-03-31 | Stop reason: HOSPADM

## 2024-03-26 RX ORDER — SODIUM CHLORIDE 9 MG/ML
100 INJECTION, SOLUTION INTRAVENOUS CONTINUOUS
Status: DISCONTINUED | OUTPATIENT
Start: 2024-03-26 | End: 2024-03-31 | Stop reason: HOSPADM

## 2024-03-26 RX ORDER — AMOXICILLIN 250 MG
2 CAPSULE ORAL 2 TIMES DAILY PRN
Status: DISCONTINUED | OUTPATIENT
Start: 2024-03-26 | End: 2024-03-31 | Stop reason: HOSPADM

## 2024-03-26 RX ORDER — BISACODYL 5 MG/1
5 TABLET, DELAYED RELEASE ORAL DAILY PRN
Status: DISCONTINUED | OUTPATIENT
Start: 2024-03-26 | End: 2024-03-31 | Stop reason: HOSPADM

## 2024-03-26 RX ORDER — ALLOPURINOL 100 MG/1
1 TABLET ORAL DAILY
COMMUNITY
Start: 2024-01-10

## 2024-03-26 RX ORDER — HYDROCHLOROTHIAZIDE 25 MG/1
25 TABLET ORAL
Status: DISCONTINUED | OUTPATIENT
Start: 2024-03-27 | End: 2024-03-31 | Stop reason: HOSPADM

## 2024-03-26 RX ORDER — CHOLECALCIFEROL (VITAMIN D3) 125 MCG
5 CAPSULE ORAL NIGHTLY PRN
Status: DISCONTINUED | OUTPATIENT
Start: 2024-03-26 | End: 2024-03-31 | Stop reason: HOSPADM

## 2024-03-26 RX ORDER — ACETAMINOPHEN 160 MG/5ML
650 SOLUTION ORAL EVERY 4 HOURS PRN
Status: DISCONTINUED | OUTPATIENT
Start: 2024-03-26 | End: 2024-03-31 | Stop reason: HOSPADM

## 2024-03-26 RX ORDER — SODIUM CHLORIDE 9 MG/ML
40 INJECTION, SOLUTION INTRAVENOUS AS NEEDED
Status: DISCONTINUED | OUTPATIENT
Start: 2024-03-26 | End: 2024-03-31 | Stop reason: HOSPADM

## 2024-03-26 RX ORDER — BISACODYL 10 MG
10 SUPPOSITORY, RECTAL RECTAL DAILY PRN
Status: DISCONTINUED | OUTPATIENT
Start: 2024-03-26 | End: 2024-03-31 | Stop reason: HOSPADM

## 2024-03-26 RX ORDER — LISINOPRIL AND HYDROCHLOROTHIAZIDE 25; 20 MG/1; MG/1
1 TABLET ORAL DAILY
COMMUNITY
Start: 2024-01-21

## 2024-03-26 RX ORDER — SULFAMETHOXAZOLE AND TRIMETHOPRIM 800; 160 MG/1; MG/1
1 TABLET ORAL EVERY 12 HOURS SCHEDULED
COMMUNITY
Start: 2024-03-19 | End: 2024-03-27

## 2024-03-26 RX ORDER — ESCITALOPRAM OXALATE 10 MG/1
1 TABLET ORAL DAILY
COMMUNITY
Start: 2024-01-17

## 2024-03-26 RX ADMIN — METHYLPREDNISOLONE SODIUM SUCCINATE 125 MG: 125 INJECTION, POWDER, FOR SOLUTION INTRAMUSCULAR; INTRAVENOUS at 22:28

## 2024-03-26 RX ADMIN — Medication 10 ML: at 21:51

## 2024-03-26 RX ADMIN — SODIUM CHLORIDE 100 ML/HR: 9 INJECTION, SOLUTION INTRAVENOUS at 22:28

## 2024-03-26 NOTE — Clinical Note
Level of Care: Med/Surg [1]   Diagnosis: CRISTIAN (acute kidney injury) [531559]   Admitting Physician: JULIO DONALD [[id number]]   Attending Physician: JULIO DONALD [[id number]]   Certification: I Certify That Inpatient Hospital Services Are Medically Necessary For Greater Than 2 Midnights

## 2024-03-27 ENCOUNTER — APPOINTMENT (OUTPATIENT)
Dept: ULTRASOUND IMAGING | Facility: HOSPITAL | Age: 84
DRG: 683 | End: 2024-03-27
Payer: MEDICARE

## 2024-03-27 PROBLEM — R41.82 ALTERED MENTAL STATUS: Status: ACTIVE | Noted: 2024-03-27

## 2024-03-27 LAB
ANION GAP SERPL CALCULATED.3IONS-SCNC: 14 MMOL/L (ref 5–15)
BASOPHILS # BLD AUTO: 0.01 10*3/MM3 (ref 0–0.2)
BASOPHILS NFR BLD AUTO: 0.1 % (ref 0–1.5)
BUN SERPL-MCNC: 39 MG/DL (ref 8–23)
BUN/CREAT SERPL: 22.4 (ref 7–25)
CALCIUM SPEC-SCNC: 9.5 MG/DL (ref 8.6–10.5)
CHLORIDE SERPL-SCNC: 101 MMOL/L (ref 98–107)
CO2 SERPL-SCNC: 20 MMOL/L (ref 22–29)
CREAT SERPL-MCNC: 1.74 MG/DL (ref 0.57–1)
DEPRECATED RDW RBC AUTO: 55.8 FL (ref 37–54)
EGFRCR SERPLBLD CKD-EPI 2021: 29.2 ML/MIN/1.73
EOSINOPHIL # BLD AUTO: 0.01 10*3/MM3 (ref 0–0.4)
EOSINOPHIL NFR BLD AUTO: 0.1 % (ref 0.3–6.2)
ERYTHROCYTE [DISTWIDTH] IN BLOOD BY AUTOMATED COUNT: 15.8 % (ref 12.3–15.4)
GLUCOSE BLDC GLUCOMTR-MCNC: 135 MG/DL (ref 70–105)
GLUCOSE SERPL-MCNC: 158 MG/DL (ref 65–99)
HCT VFR BLD AUTO: 42.1 % (ref 34–46.6)
HGB BLD-MCNC: 13.1 G/DL (ref 12–15.9)
IMM GRANULOCYTES # BLD AUTO: 0.07 10*3/MM3 (ref 0–0.05)
IMM GRANULOCYTES NFR BLD AUTO: 0.9 % (ref 0–0.5)
LYMPHOCYTES # BLD AUTO: 0.74 10*3/MM3 (ref 0.7–3.1)
LYMPHOCYTES NFR BLD AUTO: 9.4 % (ref 19.6–45.3)
MCH RBC QN AUTO: 30 PG (ref 26.6–33)
MCHC RBC AUTO-ENTMCNC: 31.1 G/DL (ref 31.5–35.7)
MCV RBC AUTO: 96.3 FL (ref 79–97)
MONOCYTES # BLD AUTO: 0.19 10*3/MM3 (ref 0.1–0.9)
MONOCYTES NFR BLD AUTO: 2.4 % (ref 5–12)
NEUTROPHILS NFR BLD AUTO: 6.83 10*3/MM3 (ref 1.7–7)
NEUTROPHILS NFR BLD AUTO: 87.1 % (ref 42.7–76)
NRBC BLD AUTO-RTO: 0 /100 WBC (ref 0–0.2)
PLATELET # BLD AUTO: 165 10*3/MM3 (ref 140–450)
PMV BLD AUTO: 10.5 FL (ref 6–12)
POTASSIUM SERPL-SCNC: 5.5 MMOL/L (ref 3.5–5.2)
QT INTERVAL: 416 MS
QTC INTERVAL: 487 MS
RBC # BLD AUTO: 4.37 10*6/MM3 (ref 3.77–5.28)
SODIUM SERPL-SCNC: 135 MMOL/L (ref 136–145)
WBC NRBC COR # BLD AUTO: 7.85 10*3/MM3 (ref 3.4–10.8)

## 2024-03-27 PROCEDURE — 85025 COMPLETE CBC W/AUTO DIFF WBC: CPT

## 2024-03-27 PROCEDURE — 80048 BASIC METABOLIC PNL TOTAL CA: CPT

## 2024-03-27 PROCEDURE — 97166 OT EVAL MOD COMPLEX 45 MIN: CPT

## 2024-03-27 PROCEDURE — 97162 PT EVAL MOD COMPLEX 30 MIN: CPT

## 2024-03-27 PROCEDURE — 99221 1ST HOSP IP/OBS SF/LOW 40: CPT | Performed by: PSYCHIATRY & NEUROLOGY

## 2024-03-27 PROCEDURE — 76775 US EXAM ABDO BACK WALL LIM: CPT

## 2024-03-27 PROCEDURE — 82948 REAGENT STRIP/BLOOD GLUCOSE: CPT

## 2024-03-27 RX ORDER — ASPIRIN 325 MG
325 TABLET ORAL DAILY
Status: DISCONTINUED | OUTPATIENT
Start: 2024-03-27 | End: 2024-03-31 | Stop reason: HOSPADM

## 2024-03-27 RX ORDER — ATORVASTATIN CALCIUM 40 MG/1
40 TABLET, FILM COATED ORAL NIGHTLY
Status: DISCONTINUED | OUTPATIENT
Start: 2024-03-27 | End: 2024-03-31 | Stop reason: HOSPADM

## 2024-03-27 RX ADMIN — ESCITALOPRAM OXALATE 10 MG: 10 TABLET ORAL at 11:49

## 2024-03-27 RX ADMIN — ALLOPURINOL 100 MG: 100 TABLET ORAL at 11:49

## 2024-03-27 RX ADMIN — ASPIRIN 325 MG ORAL TABLET 325 MG: 325 PILL ORAL at 21:42

## 2024-03-27 RX ADMIN — Medication 10 ML: at 21:42

## 2024-03-27 RX ADMIN — ATORVASTATIN CALCIUM 40 MG: 40 TABLET, FILM COATED ORAL at 21:41

## 2024-03-27 NOTE — PLAN OF CARE
Problem: Adult Inpatient Plan of Care  Goal: Absence of Hospital-Acquired Illness or Injury  Intervention: Identify and Manage Fall Risk  Recent Flowsheet Documentation  Taken 3/27/2024 1400 by Elizabeth Ghosh RN  Safety Promotion/Fall Prevention: safety round/check completed  Taken 3/27/2024 1205 by Elizabeth Ghosh RN  Safety Promotion/Fall Prevention: safety round/check completed  Taken 3/27/2024 1200 by Elizabeth Ghosh RN  Safety Promotion/Fall Prevention: safety round/check completed  Taken 3/27/2024 1000 by Elizabeth Ghosh RN  Safety Promotion/Fall Prevention: safety round/check completed  Taken 3/27/2024 0800 by Elizabeth Ghosh RN  Safety Promotion/Fall Prevention: safety round/check completed  Intervention: Prevent Skin Injury  Recent Flowsheet Documentation  Taken 3/27/2024 1400 by Elizabeth Ghosh RN  Body Position:   turned   head facing, right  Taken 3/27/2024 1200 by Elizabeth Ghosh RN  Body Position:   turned   head facing, left  Taken 3/27/2024 1000 by Elizabeth Ghosh RN  Body Position: patient/family refused  Taken 3/27/2024 0800 by Elizabeth Ghosh RN  Body Position:   supine   supine, legs elevated  Goal: Optimal Comfort and Wellbeing  Intervention: Provide Person-Centered Care  Recent Flowsheet Documentation  Taken 3/27/2024 0800 by Elizabeth Ghosh RN  Trust Relationship/Rapport:   care explained   choices provided   emotional support provided

## 2024-03-27 NOTE — PLAN OF CARE
Goal Outcome Evaluation:              Outcome Evaluation: 82 y/o F admitted to [facility] on 3/26/24 with complaints of AMS and generalized weakness. Creatinine slightly elevated, while other labs unremarkable. XR Chest (-) and CT Head (-). Being admitted for CRISTIAN and generalized weakness. She is currently living with spouse in [facility] with 5 steps to enter. At baseline, pt requires assistance from her  for ADLs and transfers. Per  report, the pt balance and confused got much worse ~3 days ago. OT requesting additional scans to r/o acute abnormalities. At time of evaluation, the pt required increased assistance for all ADL, bed mobility, transfers, and functional mobility. Pt not safe to d/c. home and would benefit from SNF at d/c. OT will follow.      Anticipated Discharge Disposition (OT): skilled nursing facility

## 2024-03-27 NOTE — THERAPY EVALUATION
Patient Name: Stephanie Sol  : 1940    MRN: [id number]                              Today's Date: 3/27/2024       Admit Date: 3/26/2024    Visit Dx:     ICD-10-CM ICD-9-CM   1. Altered mental status, unspecified altered mental status type  R41.82 780.97   2. Acute kidney injury  N17.9 584.9     Patient Active Problem List   Diagnosis    Acute left-sided weakness    Anxiety disorder, unspecified    Cellulitis of left lower limb    Encounter for surgical aftercare following surgery on the circulatory system    Gout, unspecified    Mixed hyperlipidemia    Major depressive disorder, single episode, unspecified    Other lack of coordination    Presence of cardiac pacemaker    Sick sinus syndrome    Unspecified osteoarthritis, unspecified site    Generalized muscle weakness    Primary hypertension    Acute kidney injury    Physical debility    Narrow complex tachycardia    Transient ischemic attack (TIA)    CRISTIAN (acute kidney injury)     Past Medical History:   Diagnosis Date    Essential hypertension 2022    Gout, unspecified 3/18/2022    Major depressive disorder, single episode, unspecified 3/18/2022    Presence of cardiac pacemaker 3/18/2022    Sick sinus syndrome 3/18/2022     Past Surgical History:   Procedure Laterality Date    CARDIAC ELECTROPHYSIOLOGY PROCEDURE N/A 2022    Procedure: Biv pacemaker upgradeBiotronik;  Surgeon: Karin Pedersen MD;  Location: [facility] CATH INVASIVE LOCATION;  Service: Cardiovascular;  Laterality: N/A;      General Information       Row Name 24 0845          OT Time and Intention    Document Type evaluation  -BL     Mode of Treatment occupational therapy  -BL       Row Name 24 0845          General Information    Patient Profile Reviewed yes  -BL     Prior Level of Function mod assist:;ADL's  Pt reports her  assist with transfers to w/c and ADLs  -BL     Existing Precautions/Restrictions fall  -BL       Row Name 24 0845          Living  Environment    People in Home spouse  -[facility] Name 03/27/24 0845          Home Main Entrance    Number of Stairs, Main Entrance five  -BL     Stair Railings, Main Entrance railings safe and in good condition  -[facility] Name 03/27/24 0845          Stairs Within Home, Primary    Number of Stairs, Within Home, Primary none  -BL     Stair Railings, Within Home, Primary none  -BL       [facility] Name 03/27/24 0845          Cognition    Orientation Status (Cognition) oriented to;person;disoriented to;place;situation;time  -[facility] Name 03/27/24 0845          Safety Issues, Functional Mobility    Safety Issues Affecting Function (Mobility) awareness of need for assistance;insight into deficits/self-awareness;judgment;safety precaution awareness  -     Impairments Affecting Function (Mobility) balance;endurance/activity tolerance  -               User Key  (r) = Recorded By, (t) = Taken By, (c) = Cosigned By      Initials Name Provider Type     Laura Arteaga OT Occupational Therapist                     Mobility/ADL's       [facility] 03/27/24 1509          Bed Mobility    Bed Mobility bed mobility (all) activities  -     All Activities, [place] (Bed Mobility) moderate assist (50% patient effort)  -     Assistive Device (Bed Mobility) head of bed elevated  -BL       Row Name 03/27/24 1509          Transfers    Transfers sit-stand transfer  -BL       Row Name 03/27/24 1509          Sit-Stand Transfer    Sit-Stand [place] (Transfers) moderate assist (50% patient effort)  -     Assistive Device (Sit-Stand Transfers) walker, front-wheeled  -     Comment, (Sit-Stand Transfer) Significant posterior lean in standing  -BL       Row Name 03/27/24 1509          Functional Mobility    Functional Mobility- Ind. Level moderate assist (50% patient effort)  -[facility] Name 03/27/24 1509          Activities of Daily Living    BADL Assessment/Intervention toileting  -[facility] Name 03/27/24 1509           Toileting Assessment/Training    [place] Level (Toileting) change pad/brief;perform perineal hygiene;dependent (less than 25% patient effort)  -               User Key  (r) = Recorded By, (t) = Taken By, (c) = Cosigned By      Initials Name Provider Type     Laura Arteaga OT Occupational Therapist                   Obj/Interventions       [facility] Name 03/27/24 1511          Sensory Assessment (Somatosensory)    Sensory Assessment (Somatosensory) sensation intact  -[facility] Name 03/27/24 1511          Range of Motion Comprehensive    Comment, General Range of Motion LUE WFL; R shoulder AAROM WFL secondary to previous deficits  -[facility] Name 03/27/24 1511          Strength Comprehensive (MMT)    Comment, General Manual Muscle Testing (MMT) Assessment Not tested secondary to pt confusion  -[facility] Name 03/27/24 1511          Balance    Balance Assessment sitting static balance;sitting dynamic balance;standing static balance  -BL     Static Sitting Balance contact guard  -BL     Dynamic Sitting Balance minimal assist  -BL     Position, Sitting Balance unsupported  -BL     Static Standing Balance moderate assist  -BL     Position/Device Used, Standing Balance supported  -               User Key  (r) = Recorded By, (t) = Taken By, (c) = Cosigned By      Initials Name Provider Type     Laura Arteaga OT Occupational Therapist                   Goals/Plan       [facility] Name 03/27/24 1517          Transfer Goal 1 (OT)    Activity/Assistive Device (Transfer Goal 1, OT) sit-to-stand/stand-to-sit;toilet  -BL     [place] Level/Cues Needed (Transfer Goal 1, OT) minimum assist (75% or more patient effort)  -     Time Frame (Transfer Goal 1, OT) 2 weeks  -[facility] Name 03/27/24 1517          Dressing Goal 1 (OT)    Activity/Device (Dressing Goal 1, OT) dressing skills, all  -BL     [place]/Cues Needed (Dressing Goal 1, OT) moderate assist (50-74% patient effort)  -     Time Frame  (Dressing Goal 1, OT) 2 weeks  -BL       Row Name 03/27/24 1517          Toileting Goal 1 (OT)    Activity/Device (Toileting Goal 1, OT) toileting skills, all  -BL     [place] Level/Cues Needed (Toileting Goal 1, OT) moderate assist (50-74% patient effort)  -BL     Time Frame (Toileting Goal 1, OT) 2 weeks  -BL       Row Name 03/27/24 1517          Grooming Goal 1 (OT)    Activity/Device (Grooming Goal 1, OT) grooming skills, all  -BL     [place] (Grooming Goal 1, OT) minimum assist (75% or more patient effort)  -BL     Time Frame (Grooming Goal 1, OT) 2 weeks  -BL       Row Name 03/27/24 1517          Therapy Assessment/Plan (OT)    Planned Therapy Interventions (OT) activity tolerance training;BADL retraining;adaptive equipment training;cognitive/visual perception retraining;edema control/reduction;functional balance retraining;IADL retraining;neuromuscular control/coordination retraining;occupation/activity based interventions;passive ROM/stretching;patient/caregiver education/training;ROM/therapeutic exercise;strengthening exercise;transfer/mobility retraining  -BL               User Key  (r) = Recorded By, (t) = Taken By, (c) = Cosigned By      Initials Name Provider Type    BL Laura Arteaga OT Occupational Therapist                   Clinical Impression       Row Name 03/27/24 1513          Pain Assessment    Pretreatment Pain Rating 0/10 - no pain  -BL     Posttreatment Pain Rating 0/10 - no pain  -BL       Row Name 03/27/24 1513          Plan of Care Review    Outcome Evaluation 84 y/o F admitted to [facility] on 3/26/24 with complaints of AMS and generalized weakness. Creatinine slightly elevated, while other labs unremarkable. XR Chest (-) and CT Head (-). Being admitted for CRISTIAN and generalized weakness. She is currently living with spouse in [facility] with 5 steps to enter. At baseline, pt requires assistance from her  for ADLs and transfers. Per  report, the pt balance and confused got  much worse ~3 days ago. OT requesting additional scans to r/o acute abnormalities. At time of evaluation, the pt required increased assistance for all ADL, bed mobility, transfers, and functional mobility. Pt not safe to d/c. home and would benefit from SNF at d/c. OT will follow.  -       Row Name 03/27/24 1513          Therapy Assessment/Plan (OT)    Criteria for Skilled Therapeutic Interventions Met (OT) yes;skilled treatment is necessary  -     Therapy Frequency (OT) 3 times/wk  -BL       Row Name 03/27/24 1513          Therapy Plan Review/Discharge Plan (OT)    Anticipated Discharge Disposition (OT) skilled nursing facility  -       Row Name 03/27/24 1513          Vital Signs    Recovery Time VSS  -BL       Row Name 03/27/24 1513          Positioning and Restraints    Pre-Treatment Position in bed  -BL     Post Treatment Position bed  -BL     In Bed notified nsg;call light within reach;encouraged to call for assist  No bed alarm on pt bed and RN aware  -BL               User Key  (r) = Recorded By, (t) = Taken By, (c) = Cosigned By      Initials Name Provider Type    Laura Sweeney, OT Occupational Therapist                   Outcome Measures       Row Name 03/27/24 1205 03/27/24 0800       How much help from another person do you currently need...    Turning from your back to your side while in flat bed without using bedrails? 3  -MB 3  -KB    Moving from lying on back to sitting on the side of a flat bed without bedrails? 2  -MB 2  -KB    Moving to and from a bed to a chair (including a wheelchair)? 2  -MB 2  -KB    Standing up from a chair using your arms (e.g., wheelchair, bedside chair)? 2  -MB 2  -KB    Climbing 3-5 steps with a railing? 1  -MB 1  -KB    To walk in hospital room? 1  -MB 1  -KB    AM-PAC 6 Clicks Score (PT) 11  -MB 11  -KB    Highest Level of Mobility Goal 4 --> Transfer to chair/commode  -MB 4 --> Transfer to chair/commode  -KB      Row Name 03/27/24 1205          Functional  Assessment    Outcome Measure Options AM-PAC 6 Clicks Basic Mobility (PT)  -MB               User Key  (r) = Recorded By, (t) = Taken By, (c) = Cosigned By      Initials Name Provider Type    KB Elizabeth Ghosh, RN Registered Nurse    Shiva Riddle, PT Physical Therapist                    Occupational Therapy Education       Title: PT OT SLP Therapies (Done)       Topic: Occupational Therapy (Done)       Point: ADL training (Done)       Description:   Instruct learner(s) on proper safety adaptation and remediation techniques during self care or transfers.   Instruct in proper use of assistive devices.                  Learning Progress Summary             Patient Acceptance, E,TB, VU,NR by  at 3/27/2024 1518                         Point: Precautions (Done)       Description:   Instruct learner(s) on prescribed precautions during self-care and functional transfers.                  Learning Progress Summary             Patient Acceptance, E,TB, VU,NR by  at 3/27/2024 1518                                         User Key       Initials Effective Dates Name Provider Type Discipline     09/22/22 -  Laura Arteaga OT Occupational Therapist OT                  OT Recommendation and Plan  Planned Therapy Interventions (OT): activity tolerance training, BADL retraining, adaptive equipment training, cognitive/visual perception retraining, edema control/reduction, functional balance retraining, IADL retraining, neuromuscular control/coordination retraining, occupation/activity based interventions, passive ROM/stretching, patient/caregiver education/training, ROM/therapeutic exercise, strengthening exercise, transfer/mobility retraining  Therapy Frequency (OT): 3 times/wk  Plan of Care Review  Outcome Evaluation: 82 y/o F admitted to [facility] on 3/26/24 with complaints of AMS and generalized weakness. Creatinine slightly elevated, while other labs unremarkable. XR Chest (-) and CT Head (-). Being admitted for CRISTIAN and  generalized weakness. She is currently living with spouse in [facility] with 5 steps to enter. At baseline, pt requires assistance from her  for ADLs and transfers. Per  report, the pt balance and confused got much worse ~3 days ago. OT requesting additional scans to r/o acute abnormalities. At time of evaluation, the pt required increased assistance for all ADL, bed mobility, transfers, and functional mobility. Pt not safe to d/c. home and would benefit from SNF at d/c. OT will follow.     Time Calculation:         Time Calculation- OT       Row Name 03/27/24 1519             Time Calculation- OT    OT Start Time 0842  -BL      OT Stop Time 0916  -BL      OT Time Calculation (min) 34 min  -BL      OT Received On 03/27/24  -BL      OT - Next Appointment 03/29/24  -BL      OT Goal Re-Cert Due Date 04/10/24  -BL                User Key  (r) = Recorded By, (t) = Taken By, (c) = Cosigned By      Initials Name Provider Type     Laura Arteaga OT Occupational Therapist                  Therapy Charges for Today       Code Description Service Date Service Provider Modifiers Qty    [id number] HC OT EVAL MOD COMPLEXITY 4 3/27/2024 Laura Arteaga OT GO 1                 Laura Arteaga OT  3/27/2024

## 2024-03-27 NOTE — SIGNIFICANT NOTE
03/27/24 [facility]   PASRR   PASRR Status Needs to be completed  (Pending determination of 30 day exemption eligibility. SC sent to MD at 1338.)

## 2024-03-27 NOTE — CASE MANAGEMENT/SOCIAL WORK
Continued Stay Note   Addy     Patient Name: Stephanie Sol  MRN: [id number]  Today's Date: 3/27/2024    Admit Date: 3/26/2024    Plan: [facility] accepted. Precert not required. PASRR required.   Discharge Plan       Row Name 03/27/24 1500       Plan    Plan [facility] accepted. Precert not required. PASRR required.    Plan Comments Melody with [facility] reached out to CM and let it be know that the patient is accepted into [facility]. PASRR is still required. Precert not required.                  Phone communication or documentation only - no physical contact with patient or family.     Emelina Davis RN

## 2024-03-27 NOTE — H&P
[facility] Medicine Services  History & Physical    Patient Name: Stephanie Sol  : 1940  MRN: [id number]  Primary Care Physician:  Lior Oscar MD  Date of admission: 3/26/2024  Date and Time of Service: 3/26/2024 at 2145      Assessment & Plan      Chief Complaint: altered mental status, weakness    Plan:    Home medications not verified at the time of assessment and plan    CRISTIAN  -Creatinine 2.1 (baseline 1.1 ), monitor  -IV fluids ordered  -Avoid nephrotoxic medication and contrast  -Avoid hypotension    Generalized Weakness  -PT/OT consult  -Fall precautions  -Case management consult for discharge planning    Essential Hypertension  -Controlled  -Monitor BP  -Continue home lisinopril, hydrochlorothiazide    Depression  -Continue Lexapro    GERD  -PPI    Rash  -Patient believes this is from the antibiotics she has been on for an abdominal infection  -Solumedrol x1 ordered  -Monitor for improvement    History of Present Illness         History of Present Illness: Stephanie Sol is a 83 y.o. female with a previous medical history of HTN, SSS, S/P pacemaker, Depression and GERD who presented to [facility] on 3/26/2024 due to altered mental status and weakness. She denies any other complaints.     In the ED, Sodium is 135, Potassium 5.6, Creatinine 2.1 (baseline 1.13). Otherwise, labs are unremarkable.  UA shows trace protein.  She is afebrile, all vitals are stable.  Hospitalist was consulted for further management.    12 point ROS reviewed and negative except as mentioned above        Objective      Vitals:   Temp:  [100.5 °F (38.1 °C)] 100.5 °F (38.1 °C)  Heart Rate:  [60-75] 65  Resp:  [16-18] 18  BP: (110-149)/(60-85) 123/64  Body mass index is 24.69 kg/m².  Physical Exam  Vitals and nursing note reviewed.   Constitutional:       Appearance: Normal appearance.   HENT:      Mouth/Throat:      Mouth: Mucous membranes are moist.   Cardiovascular:      Rate and Rhythm: Normal rate  and regular rhythm.   Pulmonary:      Effort: Pulmonary effort is normal.      Breath sounds: Normal breath sounds.   Abdominal:      General: Bowel sounds are normal.      Palpations: Abdomen is soft.   Musculoskeletal:         General: Normal range of motion.   Skin:     General: Skin is warm and dry.      Findings: Rash (generalized, over her entire body) present.   Neurological:      General: No focal deficit present.      Mental Status: She is alert and oriented to person, place, and time. Mental status is at baseline.   Psychiatric:         Mood and Affect: Mood normal.         Behavior: Behavior normal.            Personal History     This is a 83 y.o. female with:    Past Medical History:   Diagnosis Date    Essential hypertension 11/26/2022    Gout, unspecified 3/18/2022    Major depressive disorder, single episode, unspecified 3/18/2022    Presence of cardiac pacemaker 3/18/2022    Sick sinus syndrome 3/18/2022       Past Surgical History:   Procedure Laterality Date    CARDIAC ELECTROPHYSIOLOGY PROCEDURE N/A 11/30/2022    Procedure: Biv pacemaker upgradeBiotronik;  Surgeon: Karin Pedersen MD;  Location: [facility] INVASIVE LOCATION;  Service: Cardiovascular;  Laterality: N/A;       Active and Resolved Problems  There are no hospital problems to display for this patient.      Family History: family history is not on file. Otherwise pertinent FHx was reviewed and not pertinent to current issue.    Social History:  reports that she has never smoked. She has never used smokeless tobacco. She reports that she does not drink alcohol and does not use drugs.    Home Medications:  Prior to Admission Medications       None              Allergies:  No Known Allergies        DVT prophylaxis:  Mechanical DVT prophylaxis orders are present.        CODE STATUS:      Code Status (Patient has no pulse and is not breathing): CPR (Attempt to Resuscitate)  Medical Interventions (Patient has pulse or is breathing): Full  Support        Admission Status:  I believe this patient meets observation status.    I discussed the patient's findings and my recommendations with patient.    Signature:     This document has been electronically signed by Nahed Muse DNP, APRN on March 26, 2024 21:49 EDT   [facility]

## 2024-03-27 NOTE — PLAN OF CARE
Goal Outcome Evaluation:  Plan of Care Reviewed With: patient, spouse        Progress: no change   Pt is a 84 y/o F admitted to [facility] on 3/26/24 with complaints of AMS and generalized weakness. Creatinine slightly elevated, while other labs unremarkable. XR Chest (-) and CT Head (-). Being admitted for CRISTIAN and generalized weakness. She is currently living with spouse in [facility] with 5 steps to enter. At baseline, pt is normally requiring mod A for transfers and ADLs from spouse. Reports she was ambulating a couple weeks prior with rollator, but has not ambulated in ~3 weeks. Spouse performs all the cooking and completes all housework, but does mention it has gotten very difficult for him to manage. This date, she is only oriented to self and lying supine in bed with spouse at bedside. She completes all mobility mod A this date, exhibiting significant generalized weakness and some difficulty following commands. Seems to be functioning close to her baseline in terms of functional capacity, but spouse does not seem capable of managing her current condition at home any longer. Recommending SNF during stay and PT will continue to follow to promote mobility and maintain current strength levels.    Anticipated Discharge Disposition (PT): skilled nursing facility

## 2024-03-27 NOTE — PROGRESS NOTES
[facility] Medicine Services       Patient Name: Stephanie Sol  : 1940  MRN: [id number]  Primary Care Physician:  Lior Oscar MD  Date of admission: 3/26/2024  Date and Time of Service: 3/26/2024 at 2145        Assessment & Plan       Chief Complaint: altered mental status, weakness     Plan:     Home medications not verified at the time of assessment and plan     CRISTIAN  -Creatinine 2.1 (baseline 1.1 ), monitor  -IV fluids ordered  -Avoid nephrotoxic medication and contrast  -Avoid hypotension     Generalized Weakness  -PT/OT consult  -Fall precautions  -Case management consult for discharge planning     Essential Hypertension  -Controlled  -Monitor BP  -Continue home lisinopril, hydrochlorothiazide     Depression  -Continue Lexapro     GERD  -PPI     Rash  -Patient believes this is from the antibiotics she has been on for an abdominal infection  -Solumedrol x1 ordered  -Monitor for improvement     History of Present Illness            History of Present Illness: Stephanie Sol is a 83 y.o. female with a previous medical history of HTN, SSS, S/P pacemaker, Depression and GERD who presented to [facility] on 3/26/2024 due to altered mental status and weakness. She denies any other complaints.      In the ED, Sodium is 135, Potassium 5.6, Creatinine 2.1 (baseline 1.13). Otherwise, labs are unremarkable.  UA shows trace protein.  She is afebrile, all vitals are stable.  Hospitalist was consulted for further management.     12 point ROS reviewed and negative except as mentioned above      3/27  Patient is pleasantly confused  Feeling weak  Patient  report that patient has been confused as a baseline but and her balance has been extremely compared over last 3 days  Will get an MRI of the brain for further evaluation to evaluate posterior fossa for any cerebellar stroke that could have caused the balance issue  Her creatinine is down to 1.74 from 2.14 and is awaiting nephrology  evaluation  Continue hydration and follow daily creatinine  I tried to order MRI patient had some metallic implant so could not have an MRI    Patient had CT of the brain yesterday with no acute abnormalities  Will get neuroconsult for further recommendation     Objective       Vitals:   Temp:  [100.5 °F (38.1 °C)] 100.5 °F (38.1 °C)  Heart Rate:  [60-75] 65  Resp:  [16-18] 18  BP: (110-149)/(60-85) 123/64  Body mass index is 24.69 kg/m².  Physical Exam  Vitals and nursing note reviewed.   Constitutional:       Appearance: Normal appearance.   HENT:      Mouth/Throat:      Mouth: Mucous membranes are moist.   Cardiovascular:      Rate and Rhythm: Normal rate and regular rhythm.   Pulmonary:      Effort: Pulmonary effort is normal.      Breath sounds: Normal breath sounds.   Abdominal:      General: Bowel sounds are normal.      Palpations: Abdomen is soft.   Musculoskeletal:         General: Normal range of motion.   Skin:     General: Skin is warm and dry.      Findings: Rash (generalized, over her entire body) present.   Neurological:      General: No focal deficit present.      Mental Status: She is alert and oriented to person, place, and time. Mental status is at baseline.   Psychiatric:         Mood and Affect: Mood normal.         Behavior: Behavior normal.               Personal History      This is a 83 y.o. female with:     Medical History        Past Medical History:   Diagnosis Date    Essential hypertension 11/26/2022    Gout, unspecified 3/18/2022    Major depressive disorder, single episode, unspecified 3/18/2022    Presence of cardiac pacemaker 3/18/2022    Sick sinus syndrome 3/18/2022            Surgical History         Past Surgical History:   Procedure Laterality Date    CARDIAC ELECTROPHYSIOLOGY PROCEDURE N/A 11/30/2022     Procedure: Biv pacemaker upgradeBiotronik;  Surgeon: Karin Pedersen MD;  Location: [facility] INVASIVE LOCATION;  Service: Cardiovascular;  Laterality: N/A;             Active and Resolved Problems  There are no hospital problems to display for this patient.        Family History: family history is not on file. Otherwise pertinent FHx was reviewed and not pertinent to current issue.     Social History:  reports that she has never smoked. She has never used smokeless tobacco. She reports that she does not drink alcohol and does not use drugs.     Home Medications:  Prior to Admission Medications         None                   Allergies:  Allergies   No Known Allergies              DVT prophylaxis:  Mechanical DVT prophylaxis orders are present.           CODE STATUS:       Code Status (Patient has no pulse and is not breathing): CPR (Attempt to Resuscitate)  Medical Interventions (Patient has pulse or is breathing): Full Support           Admission Status:  I believe this patient meets observation status.     I discussed the patient's findings and my recommendations with patient.     Signature:

## 2024-03-27 NOTE — ED NOTES
Called [employer] Hotline [phone] regarding pt pacemaker, ordered for interrogation. Talked to Anayeli, and states that a rep will call named Josh Howell.

## 2024-03-27 NOTE — CONSULTS
"  Nephrology consult note     Subjective     History of Present Illness:  Patient is a 83 y.o. female  with a previous medical history of HTN, SSS - S/P pacemaker, Depression and GERD who presented to [facility] on 3/26/2024 due to altered mental status and weakness.Patient report  not feeling herself in the last few days.  She said she been taking Ibuprofen for long time that was started by her PCP and she is not sure why. She also started developing rash all over her body for the last 3 days.  She denies any other complaints. Labs shows  Sodium is 135, Potassium 5.6, Creatinine 2.1 (baseline 1.13). Otherwise, labs are unremarkable.  UA shows trace protein.  She is afebrile, all vitals are stable.      History reviewed. No pertinent family history.  Social History     Socioeconomic History    Marital status:    Tobacco Use    Smoking status: Never    Smokeless tobacco: Never   Vaping Use    Vaping status: Never Used   Substance and Sexual Activity    Alcohol use: Never    Drug use: Never    Sexual activity: Defer     Past Medical History:   Diagnosis Date    Essential hypertension 11/26/2022    Gout, unspecified 3/18/2022    Major depressive disorder, single episode, unspecified 3/18/2022    Presence of cardiac pacemaker 3/18/2022    Sick sinus syndrome 3/18/2022     Past Surgical History:   Procedure Laterality Date    CARDIAC ELECTROPHYSIOLOGY PROCEDURE N/A 11/30/2022    Procedure: Biv pacemaker upgradeBiotronik;  Surgeon: Karin Pedersen MD;  Location: [facility] INVASIVE LOCATION;  Service: Cardiovascular;  Laterality: N/A;     (Not in a hospital admission)    Patient has no known allergies.    Objective     Review of Systems\"   10 ROS obtained and negative except what mentioned on HPI         Physical Exam:    Constitutional:       Appearance: Normal appearance.   HENT:      Mouth/Throat:      Mouth: Mucous membranes are moist.   Cardiovascular:      Rate and Rhythm: Normal rate and " regular rhythm.   Pulmonary:      Effort: Pulmonary effort is normal.      Breath sounds: Normal breath sounds.   Abdominal:      General: Bowel sounds are normal.      Palpations: Abdomen is soft.   Musculoskeletal:         General: Normal range of motion   Skin: Rash noted over her body      Assessment & Plan       # CRISTIAN - likely AIN from Ibuprofen She said she been taking Ibuprofen for long time that was started by her PCP and she is not sure why. She also started developing rash all over her body for the last 3 days   (baseline 1.13).    # Hyperkalemia in the setting of CRISTIAN   # AMS     Plan:   Agree with IVF. Continue with a rate of 100 cc/h   Will obtain kidney US   Will obtain UPCR   Discussed biopsy with the patient   Will obtain urine Na and Cr  and evaluate FeNa   Will give 1 dose of  5  g Lokelma  Strict I/O         I discussed with the patient/legal representative the risks and the benefits associated with the proposed procedure(s).  The patient/legal representative has been given the opportunity to ask questions.  Alternatives to the proposed treatment (s) were discussed, including the likely results of no treatment.  The patient/legal representative wishes to proceed.       Bridgette Jordan MD  03/27/24  15:53 EDT

## 2024-03-27 NOTE — ED PROVIDER NOTES
Subjective   History of Present Illness  Patient is an 83-year-old female EMS due to mental status change and weakness.  Patient states she feels weak and achy body wide without other acute complaint.  She denies cough fever chest pain vomiting diarrhea or other complaint      Review of Systems    Past Medical History:   Diagnosis Date    Essential hypertension 11/26/2022    Gout, unspecified 3/18/2022    Major depressive disorder, single episode, unspecified 3/18/2022    Presence of cardiac pacemaker 3/18/2022    Sick sinus syndrome 3/18/2022       No Known Allergies    Past Surgical History:   Procedure Laterality Date    CARDIAC ELECTROPHYSIOLOGY PROCEDURE N/A 11/30/2022    Procedure: Biv pacemaker upgradeBiotronik;  Surgeon: Karin Pedersen MD;  Location: [facility] CATH INVASIVE LOCATION;  Service: Cardiovascular;  Laterality: N/A;       No family history on file.    Social History     Socioeconomic History    Marital status:    Tobacco Use    Smoking status: Never    Smokeless tobacco: Never   Vaping Use    Vaping status: Never Used   Substance and Sexual Activity    Alcohol use: Never    Drug use: Never    Sexual activity: Defer           Objective   Physical Exam  Neck has no adenopathy JVD or bruits.  Lungs are clear.  Heart has regular rate rhythm without murmur rub or gallop.  Chest is nontender.  Ab soft.  Extremities unremarkable.  Skin exam shows patient have a macular rash body wide.  Neurologic exam shows the patient be mildly confused with no focal neurologic deficits.  Procedures     My EKG interpretation shows atrial fibrillation at a rate of 82 with no acute ST change      ED Course      Results for orders placed or performed during the hospital encounter of 03/26/24   COVID-19 and FLU A/B PCR, 1 HR TAT - Swab, Nasopharynx    Specimen: Nasopharynx; Swab   Result Value Ref Range    COVID19 Not Detected Not Detected - Ref. Range    Influenza A PCR Not Detected Not Detected    Influenza B PCR  Not Detected Not Detected   Comprehensive Metabolic Panel    Specimen: Blood   Result Value Ref Range    Glucose 133 (H) 65 - 99 mg/dL    BUN 36 (H) 8 - 23 mg/dL    Creatinine 2.10 (H) 0.57 - 1.00 mg/dL    Sodium 135 (L) 136 - 145 mmol/L    Potassium 5.6 (H) 3.5 - 5.2 mmol/L    Chloride 102 98 - 107 mmol/L    CO2 20.0 (L) 22.0 - 29.0 mmol/L    Calcium 9.7 8.6 - 10.5 mg/dL    Total Protein 7.4 6.0 - 8.5 g/dL    Albumin 4.4 3.5 - 5.2 g/dL    ALT (SGPT) 20 1 - 33 U/L    AST (SGOT) 29 1 - 32 U/L    Alkaline Phosphatase 106 39 - 117 U/L    Total Bilirubin 0.2 0.0 - 1.2 mg/dL    Globulin 3.0 gm/dL    A/G Ratio 1.5 g/dL    BUN/Creatinine Ratio 17.1 7.0 - 25.0    Anion Gap 13.0 5.0 - 15.0 mmol/L    eGFR 23.3 (L) >60.0 mL/min/1.73   Urinalysis With Microscopic If Indicated (No Culture) - Straight Cath    Specimen: Straight Cath; Urine   Result Value Ref Range    Color, UA Yellow Yellow, Straw    Appearance, UA Clear Clear    pH, UA <=5.0 5.0 - 8.0    Specific Gravity, UA 1.017 1.005 - 1.030    Glucose, UA Negative Negative    Ketones, UA Negative Negative    Bilirubin, UA Negative Negative    Blood, UA Negative Negative    Protein, UA Trace (A) Negative    Leuk Esterase, UA Negative Negative    Nitrite, UA Negative Negative    Urobilinogen, UA 0.2 E.U./dL 0.2 - 1.0 E.U./dL   CBC Auto Differential    Specimen: Blood   Result Value Ref Range    WBC 8.56 3.40 - 10.80 10*3/mm3    RBC 4.40 3.77 - 5.28 10*6/mm3    Hemoglobin 13.2 12.0 - 15.9 g/dL    Hematocrit 42.5 34.0 - 46.6 %    MCV 96.6 79.0 - 97.0 fL    MCH 30.0 26.6 - 33.0 pg    MCHC 31.1 (L) 31.5 - 35.7 g/dL    RDW 15.8 (H) 12.3 - 15.4 %    RDW-SD 56.1 (H) 37.0 - 54.0 fl    MPV 10.7 6.0 - 12.0 fL    Platelets 179 140 - 450 10*3/mm3    Neutrophil % 77.0 (H) 42.7 - 76.0 %    Lymphocyte % 14.5 (L) 19.6 - 45.3 %    Monocyte % 6.3 5.0 - 12.0 %    Eosinophil % 1.2 0.3 - 6.2 %    Basophil % 0.2 0.0 - 1.5 %    Immature Grans % 0.8 (H) 0.0 - 0.5 %    Neutrophils, Absolute 6.59 1.70  - 7.00 10*3/mm3    Lymphocytes, Absolute 1.24 0.70 - 3.10 10*3/mm3    Monocytes, Absolute 0.54 0.10 - 0.90 10*3/mm3    Eosinophils, Absolute 0.10 0.00 - 0.40 10*3/mm3    Basophils, Absolute 0.02 0.00 - 0.20 10*3/mm3    Immature Grans, Absolute 0.07 (H) 0.00 - 0.05 10*3/mm3    nRBC 0.0 0.0 - 0.2 /100 WBC   ECG 12 Lead Altered Mental Status   Result Value Ref Range    QT Interval 416 ms    QTC Interval 487 ms   Gold Top - SST   Result Value Ref Range    Extra Tube Hold for add-ons.    Light Blue Top   Result Value Ref Range    Extra Tube Hold for add-ons.      CT Head Without Contrast    Result Date: 3/26/2024  Impression: No acute intracranial process evident. Age-related involutional changes and findings compatible with changes of chronic microvascular ischemia. Electronically Signed: John Jonas MD  3/26/2024 9:04 PM EDT  Workstation ID: [id number]    XR Chest 1 View    Result Date: 3/26/2024  Impression: No active cardiopulmonary disease Electronically Signed: Terence Lemos  3/26/2024 7:16 PM EDT  Workstation ID: [id number]                                          Medical Decision Making  My chest x-ray interpretation shows no cardiomegaly fusion or infiltrate.  My interpretation.  CT scan head without contrast shows no intracerebral hemorrhage or mass effect.  Patient is negative for COVID and influenza.  BMP shows renal insufficiency with BUN of 36 creatinine 2.1.  Patient has no other electrolyte abnormality.  Patient has no evidence of hepatitis with normal LFTs.  UA shows no evidence of acute infectious process.  Hemoglobin is 8.5.  Patient has no left shift and no anemia.  Patient's rash is most likely secondary to the sulfa drug she is on for recent UTI.  Patient will be admitted for further IV hydration and evaluation.  I did speak to the on-call hospitalist.    Amount and/or Complexity of Data Reviewed  Labs: ordered. Decision-making details documented in ED Course.  Radiology: ordered and independent  interpretation performed.  ECG/medicine tests: ordered and independent interpretation performed.    Risk  Prescription drug management.  Decision regarding hospitalization.        Final diagnoses:   Altered mental status, unspecified altered mental status type   Acute kidney injury       ED Disposition  ED Disposition       ED Disposition   Decision to Admit    Condition   --    Comment   --               No follow-up provider specified.       Medication List      No changes were made to your prescriptions during this visit.            Rolo Zaman MD  03/26/24 4710

## 2024-03-27 NOTE — THERAPY EVALUATION
Patient Name: Stephanie Sol  : 1940    MRN: [id number]                              Today's Date: 3/27/2024       Admit Date: 3/26/2024    Visit Dx:     ICD-10-CM ICD-9-CM   1. Altered mental status, unspecified altered mental status type  R41.82 780.97   2. Acute kidney injury  N17.9 584.9     Patient Active Problem List   Diagnosis    Acute left-sided weakness    Anxiety disorder, unspecified    Cellulitis of left lower limb    Encounter for surgical aftercare following surgery on the circulatory system    Gout, unspecified    Mixed hyperlipidemia    Major depressive disorder, single episode, unspecified    Other lack of coordination    Presence of cardiac pacemaker    Sick sinus syndrome    Unspecified osteoarthritis, unspecified site    Generalized muscle weakness    Primary hypertension    Acute kidney injury    Physical debility    Narrow complex tachycardia    Transient ischemic attack (TIA)    CRISTIAN (acute kidney injury)     Past Medical History:   Diagnosis Date    Essential hypertension 2022    Gout, unspecified 3/18/2022    Major depressive disorder, single episode, unspecified 3/18/2022    Presence of cardiac pacemaker 3/18/2022    Sick sinus syndrome 3/18/2022     Past Surgical History:   Procedure Laterality Date    CARDIAC ELECTROPHYSIOLOGY PROCEDURE N/A 2022    Procedure: Biv pacemaker upgradeBiotronik;  Surgeon: Karin Pedersen MD;  Location: [facility] INVASIVE LOCATION;  Service: Cardiovascular;  Laterality: N/A;      General Information       Row Name 24 1157          Physical Therapy Time and Intention    Document Type evaluation  -MB     Mode of Treatment physical therapy  -MB       Row Name 24 1157          General Information    Patient Profile Reviewed yes  -MB     Prior Level of Function mod assist:;transfer;w/c or scooter;bed mobility;ADL's;max assist:;cooking;home management  -MB     Existing Precautions/Restrictions fall  -MB     Barriers to Rehab  medically complex;previous functional deficit  -MB       Row Name 03/27/24 1157          Living Environment    People in Home spouse  -MB       Row Name 03/27/24 1157          Home Main Entrance    Number of Stairs, Main Entrance five  -MB     Stair Railings, Main Entrance railings safe and in good condition  -MB       Row Name 03/27/24 1157          Stairs Within Home, Primary    Number of Stairs, Within Home, Primary none  -MB     Stair Railings, Within Home, Primary none  -MB       Row Name 03/27/24 1157          Cognition    Orientation Status (Cognition) oriented to;place  -MB       Row Name 03/27/24 1157          Safety Issues, Functional Mobility    Safety Issues Affecting Function (Mobility) awareness of need for assistance;insight into deficits/self-awareness;judgment;safety precaution awareness  -MB     Impairments Affecting Function (Mobility) balance;endurance/activity tolerance;strength  -MB               User Key  (r) = Recorded By, (t) = Taken By, (c) = Cosigned By      Initials Name Provider Type    Shiva Riddle, THANH Physical Therapist                   Mobility       Row Name 03/27/24 1202          Bed Mobility    Bed Mobility bed mobility (all) activities  -MB     All Activities, [place] (Bed Mobility) moderate assist (50% patient effort)  -MB     Assistive Device (Bed Mobility) head of bed elevated  -MB       Row Name 03/27/24 1202          Sit-Stand Transfer    Sit-Stand [place] (Transfers) moderate assist (50% patient effort)  -MB     Comment, (Sit-Stand Transfer) no AD  -MB       Row Name 03/27/24 1202          Gait/Stairs (Locomotion)    Patient was able to Ambulate no, other medical factors prevent ambulation  -MB     Reason Patient was unable to Ambulate Excessive Weakness  -MB               User Key  (r) = Recorded By, (t) = Taken By, (c) = Cosigned By      Initials Name Provider Type    Shiva Riddle PT Physical Therapist                   Obj/Interventions       Row Name  03/27/24 1202          Range of Motion Comprehensive    General Range of Motion no range of motion deficits identified  -MB       Row Name 03/27/24 1202          Strength Comprehensive (MMT)    Comment, General Manual Muscle Testing (MMT) Assessment BLE Strength 3+/5 grossly  -MB       Row Name 03/27/24 1202          Balance    Balance Assessment sitting static balance;sitting dynamic balance;sit to stand dynamic balance  -MB     Static Sitting Balance contact guard  -MB     Dynamic Sitting Balance minimal assist  -MB     Position, Sitting Balance unsupported;sitting edge of bed  -MB     Sit to Stand Dynamic Balance contact guard  -MB       Row Name 03/27/24 1202          Sensory Assessment (Somatosensory)    Sensory Assessment (Somatosensory) sensation intact  -MB               User Key  (r) = Recorded By, (t) = Taken By, (c) = Cosigned By      Initials Name Provider Type    Shiva Riddle, PT Physical Therapist                   Goals/Plan       Row Name 03/27/24 1204          Bed Mobility Goal 1 (PT)    Activity/Assistive Device (Bed Mobility Goal 1, PT) bed mobility activities, all  -MB     [place] Level/Cues Needed (Bed Mobility Goal 1, PT) contact guard required  -MB     Time Frame (Bed Mobility Goal 1, PT) long term goal (LTG);2 weeks  -MB       Row Name 03/27/24 1204          Transfer Goal 1 (PT)    Activity/Assistive Device (Transfer Goal 1, PT) transfers, all;walker, rolling  -MB     [place] Level/Cues Needed (Transfer Goal 1, PT) minimum assist (75% or more patient effort)  -MB     Time Frame (Transfer Goal 1, PT) long term goal (LTG);2 weeks  -MB       Row Name 03/27/24 1204          Therapy Assessment/Plan (PT)    Planned Therapy Interventions (PT) balance training;bed mobility training;home exercise program;gait training;neuromuscular re-education;patient/family education;strengthening;transfer training  -MB               User Key  (r) = Recorded By, (t) = Taken By, (c) = Cosigned By       Initials Name Provider Type    Shiva Riddle, PT Physical Therapist                   Clinical Impression       Row Name 03/27/24 1203          Pain    Pretreatment Pain Rating 0/10 - no pain  -MB     Posttreatment Pain Rating 0/10 - no pain  -MB       Row Name 03/27/24 1218 03/27/24 1203       Plan of Care Review    Plan of Care Reviewed With -- patient;spouse  -MB    Progress -- no change  -MB    Outcome Evaluation Pt is a 82 y/o F admitted to [facility] on 3/26/24 with complaints of AMS and generalized weakness. Creatinine slightly elevated, while other labs unremarkable. XR Chest (-) and CT Head (-). Being admitted for CRISTIAN and generalized weakness. She is currently living with spouse in [facility] with 5 steps to enter. At baseline, pt is normally requiring mod A for transfers and ADLs from spouse. Reports she was ambulating a couple weeks prior with rollator, but has not ambulated in ~3 weeks. Spouse performs all the cooking and completes all housework, but does mention it has gotten very difficult for him to manage. This date, she is only oriented to self and lying supine in bed with spouse at bedside. She completes all mobility mod A this date, exhibiting significant generalized weakness and some difficulty following commands. Seems to be functioning close to her baseline in terms of functional capacity, but spouse does not seem capable of managing her current condition at home any longer. Recommending SNF during stay and PT will continue to follow to promote mobility and maintain current strength levels.  -MB --      Row Name 03/27/24 1203          Therapy Assessment/Plan (PT)    Rehab Potential (PT) fair, will monitor progress closely  -MB     Criteria for Skilled Interventions Met (PT) yes;meets criteria  -MB     Therapy Frequency (PT) 3 times/wk  -MB     Predicted Duration of Therapy Intervention (PT) until d/c  -MB       Row Name 03/27/24 1203          Vital Signs    Pre Patient Position Supine  -MB     Intra  Patient Position Standing  -MB     Post Patient Position Supine  -MB       Row Name 03/27/24 1203          Positioning and Restraints    Pre-Treatment Position in bed  -MB     Post Treatment Position bed  -MB     In Bed notified nsg;supine;call light within reach;encouraged to call for assist;with family/caregiver  -MB               User Key  (r) = Recorded By, (t) = Taken By, (c) = Cosigned By      Initials Name Provider Type    Shiva Riddle PT Physical Therapist                   Outcome Measures       Row Name 03/27/24 1205          How much help from another person do you currently need...    Turning from your back to your side while in flat bed without using bedrails? 3  -MB     Moving from lying on back to sitting on the side of a flat bed without bedrails? 2  -MB     Moving to and from a bed to a chair (including a wheelchair)? 2  -MB     Standing up from a chair using your arms (e.g., wheelchair, bedside chair)? 2  -MB     Climbing 3-5 steps with a railing? 1  -MB     To walk in hospital room? 1  -MB     AM-PAC 6 Clicks Score (PT) 11  -MB     Highest Level of Mobility Goal 4 --> Transfer to chair/commode  -MB       Row Name 03/27/24 1205          Functional Assessment    Outcome Measure Options AM-PAC 6 Clicks Basic Mobility (PT)  -MB               User Key  (r) = Recorded By, (t) = Taken By, (c) = Cosigned By      Initials Name Provider Type    Shiva Riddle, THANH Physical Therapist                                 Physical Therapy Education       Title: PT OT SLP Therapies (Done)       Topic: Physical Therapy (Done)       Point: Mobility training (Done)       Learning Progress Summary             Patient Acceptance, E,TB, VU by MB at 3/27/2024 1205                         Point: Body mechanics (Done)       Learning Progress Summary             Patient Acceptance, E,TB, VU by MB at 3/27/2024 1205                         Point: Precautions (Done)       Learning Progress Summary             Patient  Acceptance, E,TB, VU by MB at 3/27/2024 1205                                         User Key       Initials Effective Dates Name Provider Type Discipline    MB 06/06/23 -  Shiva Seymour, PT Physical Therapist PT                  PT Recommendation and Plan  Planned Therapy Interventions (PT): balance training, bed mobility training, home exercise program, gait training, neuromuscular re-education, patient/family education, strengthening, transfer training  Plan of Care Reviewed With: patient, spouse  Progress: no change  Outcome Evaluation: Pt is a 84 y/o F admitted to [facility] on 3/26/24 with complaints of AMS and generalized weakness. Creatinine slightly elevated, while other labs unremarkable. XR Chest (-) and CT Head (-). Being admitted for CRISTIAN and generalized weakness. She is currently living with spouse in [facility] with 5 steps to enter. At baseline, pt is normally requiring mod A for transfers and ADLs from spouse. Reports she was ambulating a couple weeks prior with rollator, but has not ambulated in ~3 weeks. Spouse performs all the cooking and completes all housework, but does mention it has gotten very difficult for him to manage. This date, she is only oriented to self and lying supine in bed with spouse at bedside. She completes all mobility mod A this date, exhibiting significant generalized weakness and some difficulty following commands. Seems to be functioning close to her baseline in terms of functional capacity, but spouse does not seem capable of managing her current condition at home any longer. Recommending SNF during stay and PT will continue to follow to promote mobility and maintain current strength levels.     Time Calculation:   PT Evaluation Complexity  History, PT Evaluation Complexity: 1-2 personal factors and/or comorbidities  Examination of Body Systems (PT Eval Complexity): total of 3 or more elements  Clinical Presentation (PT Evaluation Complexity): evolving  Clinical Decision Making (PT  Evaluation Complexity): moderate complexity  Overall Complexity (PT Evaluation Complexity): moderate complexity     PT Charges       Row Name 03/27/24 1206             Time Calculation    Start Time 1112  -MB      Stop Time 1140  -MB      Time Calculation (min) 28 min  -MB      PT Received On 03/27/24  -MB      PT - Next Appointment 03/29/24  -MB      PT Goal Re-Cert Due Date 04/10/24  -MB         Time Calculation- PT    Total Timed Code Minutes- PT 0 minute(s)  -MB                User Key  (r) = Recorded By, (t) = Taken By, (c) = Cosigned By      Initials Name Provider Type    Shiva Riddle, PT Physical Therapist                  Therapy Charges for Today       Code Description Service Date Service Provider Modifiers Qty    [id number] HC PT EVAL MOD COMPLEXITY 4 3/27/2024 Shiva Seymour, PT GP 1            PT G-Codes  Outcome Measure Options: AM-PAC 6 Clicks Basic Mobility (PT)  AM-PAC 6 Clicks Score (PT): 11  PT Discharge Summary  Anticipated Discharge Disposition (PT): skilled nursing facility    Shiva Seymour PT  3/27/2024

## 2024-03-27 NOTE — CONSULTS
Primary Care Provider: Lior Oscar MD     Consult requested by: Dr. Denise    Reason for Consultation: Neurological evaluation /    History taken from: patient chart family RN    Chief complaint: Weakness       SUBJECTIVE:    History of present illness: Background per H&P: Stephanie Sol is a 83 y.o. year old female who was evaluated in room 8 in the ER at [facility]   source of information is the patient and her  who is at bedside and the medical records      This patient has some memory issues and she presented with weakness but more so on the left upper extremity and also that she is leaning towards the left side    On examination she does have some asymmetry with left upper extremity and it looks like she is hurting but there is no passive range of motion pain.  All the symptoms been going on for 3 days so I agree with the plan to get an MRI brain  As the head CT was okay and go from there    Her kidneys are not at the best so we will do imaging studies of the blood vessels if needed    No seizures or migraines    As mentioned here and elsewhere that she does have some cognitive changes            As per admitting,  CRISTIAN  -Creatinine 2.1 (baseline 1.1 ), monitor  -IV fluids ordered  -Avoid nephrotoxic medication and contrast  -Avoid hypotension     Generalized Weakness  -PT/OT consult  -Fall precautions  -Case management consult for discharge planning     Essential Hypertension  -Controlled  -Monitor BP  -Continue home lisinopril, hydrochlorothiazide     Depression  -Continue Lexapro     GERD  -PPI     Rash  -Patient believes this is from the antibiotics she has been on for an abdominal infection  -Solumedrol x1 ordered  -Monitor for improvement     History of Present Illness            History of Present Illness: Stephanie Sol is a 83 y.o. female with a previous medical history of HTN, SSS, S/P pacemaker, Depression and GERD who presented to [facility] on 3/26/2024 due to  altered mental status and weakness. She denies any other complaints.      In the ED, Sodium is 135, Potassium 5.6, Creatinine 2.1 (baseline 1.13). Otherwise, labs are unremarkable.  UA shows trace protein.  She is afebrile, all vitals are stable.  Hospitalist was consulted for further management.     12 point ROS reviewed and negative except as mentioned above           - Portions of the above HPI were copied from previous encounters and edited as appropriate. PMH as detailed below.     Review of Systems   No fever chills rigors or sweats  No weight issues  No sleep problems  HEENT:  No speech problem, vision changes, facial asymmetry or pain, or neck problem  Chest: No chest pain, clubbing, cyanosis, orthopnea palpitations  Pulmonary:  No shortness of air, cough or expectoration  Abdomen:  No swelling/tension, constipation,diarrhea or pain  No genitourinary symptoms  Extremity problems as discussed  No back problem  No psychotic issues  Neurologic issues as discussed  No hematologic, dermatologic or endocrine problems        PATIENT HISTORY:  Past Medical History:   Diagnosis Date    Essential hypertension 11/26/2022    Gout, unspecified 3/18/2022    Major depressive disorder, single episode, unspecified 3/18/2022    Presence of cardiac pacemaker 3/18/2022    Sick sinus syndrome 3/18/2022   ,   Past Surgical History:   Procedure Laterality Date    CARDIAC ELECTROPHYSIOLOGY PROCEDURE N/A 11/30/2022    Procedure: Biv pacemaker upgradeBiotronik;  Surgeon: Karin Pedersen MD;  Location: [facility] INVASIVE LOCATION;  Service: Cardiovascular;  Laterality: N/A;   , History reviewed. No pertinent family history.,   Social History     Tobacco Use    Smoking status: Never    Smokeless tobacco: Never   Vaping Use    Vaping status: Never Used   Substance Use Topics    Alcohol use: Never    Drug use: Never   ,   Prior to Admission medications    Medication Sig Start Date End Date Taking? Authorizing Provider   allopurinol  (ZYLOPRIM) 100 MG tablet Take 1 tablet by mouth Daily. 1/10/24  Yes ProviderBrian MD   escitalopram (LEXAPRO) 10 MG tablet Take 1 tablet by mouth Daily. 1/17/24  Yes ProviderBrian MD   lisinopril-hydrochlorothiazide (PRINZIDE,ZESTORETIC) 20-25 MG per tablet Take 1 tablet by mouth Daily. 1/21/24  Yes ProviderBrian MD   sulfamethoxazole-trimethoprim (BACTRIM DS,SEPTRA DS) 800-160 MG per tablet Take 1 tablet by mouth Every 12 (Twelve) Hours. 3/19/24 3/27/24 Yes Provider, MD Brian    Allergies:  Patient has no known allergies.    Current Facility-Administered Medications   Medication Dose Route Frequency Provider Last Rate Last Admin    acetaminophen (TYLENOL) tablet 650 mg  650 mg Oral Q4H PRN Nahed Muse APRN        Or    acetaminophen (TYLENOL) 160 MG/5ML oral solution 650 mg  650 mg Oral Q4H PRN Nahed Muse APRN        Or    acetaminophen (TYLENOL) suppository 650 mg  650 mg Rectal Q4H PRN Nahed Muse APRN        allopurinol (ZYLOPRIM) tablet 100 mg  100 mg Oral Daily Nahed Muse APRN   100 mg at 03/27/24 1149    sennosides-docusate (PERICOLACE) 8.6-50 MG per tablet 2 tablet  2 tablet Oral BID PRN Nahed Muse APRTEO        And    polyethylene glycol (MIRALAX) packet 17 g  17 g Oral Daily PRN Nahed Muse APRTEO        And    bisacodyl (DULCOLAX) EC tablet 5 mg  5 mg Oral Daily PRN Nahed Muse APRN        And    bisacodyl (DULCOLAX) suppository 10 mg  10 mg Rectal Daily PRN Nahed Muse APRN        escitalopram (LEXAPRO) tablet 10 mg  10 mg Oral Daily Nahed Muse APRN   10 mg at 03/27/24 1149    lisinopril (PRINIVIL,ZESTRIL) tablet 20 mg  20 mg Oral Q24H Nahed Muse APRTEO        And    hydroCHLOROthiazide tablet 25 mg  25 mg Oral Q24H Nahed Muse APRN        melatonin tablet 5 mg  5 mg Oral Nightly PRN Nahed Muse APRN        ondansetron (ZOFRAN) injection 4 mg  4 mg Intravenous Q6H PRN Nahed Muse, JASON        sodium  chloride 0.9 % flush 10 mL  10 mL Intravenous PRN Nahed Muse L, APRN        sodium chloride 0.9 % flush 10 mL  10 mL Intravenous Q12H Nahed Muse, APRN   10 mL at 03/26/24 2151    sodium chloride 0.9 % flush 10 mL  10 mL Intravenous PRN Nahed Muse, APRN        sodium chloride 0.9 % infusion 40 mL  40 mL Intravenous PRN Nahed Muse, APRN        sodium chloride 0.9 % infusion  100 mL/hr Intravenous Continuous Nahed Muse, APRN 100 mL/hr at 03/27/24 1035 100 mL/hr at 03/27/24 1035     Current Outpatient Medications   Medication Sig Dispense Refill    allopurinol (ZYLOPRIM) 100 MG tablet Take 1 tablet by mouth Daily.      escitalopram (LEXAPRO) 10 MG tablet Take 1 tablet by mouth Daily.      lisinopril-hydrochlorothiazide (PRINZIDE,ZESTORETIC) 20-25 MG per tablet Take 1 tablet by mouth Daily.          ________________________________________________________        OBJECTIVE:  Upon today's exam, patient resting comfortably in bed in no acute distress     Neurologic Exam    The patient is awake and alert and oriented x self and her   She is not fully oriented to time or space    She can name and she can follow commands     Cranial nerve examination demonstrate:  Full fields of vision to confrontation  Pupils are round, reactive to light and accommodation and size of about 3 mm  No ptosis or nystagmus  Funduscopic examination was not successful  Eye movements are conjugate     Sensation on the face and scalp are normal  Muscles of mastication are normal and symmetric  Muscles of  facial expression are normal and symmetric  Hearing is intact bilaterally  Head turning and shoulder shrugs were unremarkable  Tongue was midline  I could not visualize  oropharynx or uvula     Motor examination:  Normal bulk, tone and strength was 5-/5 all over except left upper extremity more like 4 -  Left lower extremity questionably 4+  No fasciculations     Sensory examination:  Intact for soft touch, pain    Romberg was not evaluated     Reflexes:  0/4     Coordination:  Normal finger-to-nose to finger, rapid alternating movements and toe to finger except she was struggling to do with with the left upper extremity.  It looks like she had proximal weakness and pain but I could not confirm that with passive range of motion     Gait:  Deferred     Toe signs:  [place]    ________________________________________________________   RESULTS REVIEW:    VITAL SIGNS:   Temp:  [100.5 °F (38.1 °C)] 100.5 °F (38.1 °C)  Heart Rate:  [60-75] 60  Resp:  [16-20] 20  BP: ()/(55-85) 133/58     LABS:      Lab 03/27/24  0648 03/26/24  1906   WBC 7.85 8.56   HEMOGLOBIN 13.1 13.2   HEMATOCRIT 42.1 42.5   PLATELETS 165 179   NEUTROS ABS 6.83 6.59   IMMATURE GRANS (ABS) 0.07* 0.07*   LYMPHS ABS 0.74 1.24   MONOS ABS 0.19 0.54   EOS ABS 0.01 0.10   MCV 96.3 96.6         Lab 03/27/24  0648 03/26/24  1906   SODIUM 135* 135*   POTASSIUM 5.5* 5.6*   CHLORIDE 101 102   CO2 20.0* 20.0*   ANION GAP 14.0 13.0   BUN 39* 36*   CREATININE 1.74* 2.10*   EGFR 29.2* 23.3*   GLUCOSE 158* 133*   CALCIUM 9.5 9.7         Lab 03/26/24  1906   TOTAL PROTEIN 7.4   ALBUMIN 4.4   GLOBULIN 3.0   ALT (SGPT) 20   AST (SGOT) 29   BILIRUBIN 0.2   ALK PHOS 106                     UA          3/26/2024    19:31   Urinalysis   Specific [place], UA 1.017    Ketones, UA Negative    Blood, UA Negative    Leukocytes, UA Negative    Nitrite, UA Negative        Lab Results   Component Value Date    TSH 2.850 11/27/2022     (H) 11/27/2022    HGBA1C 5.3 11/27/2022       IMAGING STUDIES:  CT Head Without Contrast    Result Date: 3/26/2024  Impression: No acute intracranial process evident. Age-related involutional changes and findings compatible with changes of chronic microvascular ischemia. Electronically Signed: John Jonas MD  3/26/2024 9:04 PM EDT  Workstation ID: [id number]    XR Chest 1 View    Result Date: 3/26/2024  Impression: No active cardiopulmonary disease  Electronically Signed: Terence Lemos  3/26/2024 7:16 PM EDT  Workstation ID: [id number]     I reviewed the patient's new clinical results.    ________________________________________________________     PROBLEM LIST:    CRISTIAN (acute kidney injury)            ASSESSMENT/PLAN:  Left-sided weakness    More than 3 years old    Very nonspecific    I agree with brain MRI and if its okay then this is very nonspecific but if it is positive then further workup will be done accordingly    Obviously she is not TNK or intervention candidate    Modification of stroke risk factors:   - Blood pressure should be less than 130/80 outpatient, HbA1c less than 6.5, LDL less than 70; b12>500 and smoking cessation if applicable. We would be grateful if the primary team / primary care physician would keep a close watch on the above targets.  - Stroke education  - Follow up with neurologist of choice      I discussed the patient's findings and my recommendations with patient, family, and nursing staff    Fredi Buchanan MD  03/27/24  15:06 EDT

## 2024-03-27 NOTE — CASE MANAGEMENT/SOCIAL WORK
Discharge Planning Assessment   Addy     Patient Name: Stephanie Sol  MRN: [id number]  Today's Date: 3/27/2024    Admit Date: 3/26/2024    Plan: [facility] pending acceptance. Precert not required. PASRR required.   Discharge Needs Assessment       Row Name 03/27/24 1406       Living Environment    People in Home spouse    Name(s) of People in Home Carisa Lira    Current Living Arrangements home    Potentially Unsafe Housing Conditions none    In the past 12 months has the electric, gas, oil, or water company threatened to shut off services in your home? No    Primary Care Provided by self    Provides Primary Care For no one    Family Caregiver if Needed spouse    Family Caregiver Names Spouse- Pankaj    Quality of Family Relationships helpful;involved;supportive    Able to Return to Prior Arrangements yes       Resource/Environmental Concerns    Resource/Environmental Concerns none    Transportation Concerns none       Transportation Needs    In the past 12 months, has lack of transportation kept you from medical appointments or from getting medications? no    In the past 12 months, has lack of transportation kept you from meetings, work, or from getting things needed for daily living? No       Food Insecurity    Within the past 12 months, you worried that your food would run out before you got the money to buy more. Never true    Within the past 12 months, the food you bought just didn't last and you didn't have money to get more. Never true       Transition Planning    Patient/Family Anticipates Transition to home with help/services    Patient/Family Anticipated Services at Transition none    Transportation Anticipated family or friend will provide       Discharge Needs Assessment    Readmission Within the Last 30 Days no previous admission in last 30 days    Equipment Currently Used at Home walker, rolling    Concerns to be Addressed discharge planning    Anticipated Changes Related to Illness none     Equipment Needed After Discharge none                   Discharge Plan       Row Name 03/27/24 1408       Plan    Plan [facility] pending acceptance. Precert not required. PASRR required.    Plan Comments Patient lives at home with her , Pankaj. Patient does not drive and her  can transport at discharge. Patient can do IADL with the help from her . PCP and pharmacy confirmed. Patient wishes to be enrolled in the meds to bed program, CM will update this in the chart. Denies financial assistance with medications and/or food. PT came and saw the patient and they are rec. the patient go to SNF. Patient and  were agreeable to SNF and gave the following choices to CM. 1. [facility], 2. [facility], 3. [facility]. CM reached out to [facility] Melody webster, pending response. CM sent secure chat to JAYLENE Martin, asking for PASRR to be started, pending response. Discharge barriers: Neurology following, nephrology following, abnormal labs, cont fluids, monitor cre levels, electrolyte management, cardiac monitoring.                  Continued Care and Services - Admitted Since 3/26/2024       Destination       Service Provider Request Status Selected Services Address Phone Fax Patient Preferred    [facility] Pending - Request Sent N/A [address] [facility] [id number] [phone] --                     Demographic Summary       Row Name 03/27/24 6036       General Information    Admission Type inpatient    Arrived From emergency department    Required Notices Provided Important Message from Medicare    Referral Source admission list    Reason for Consult discharge planning    Preferred Language English       Contact Information    Permission Granted to Share Info With                    Functional Status       Row Name 03/27/24 1406       Functional Status    Usual Activity Tolerance moderate    Current Activity Tolerance moderate       Functional  Status, IADL    Medications assistive person    Meal Preparation assistive person    Housekeeping assistive person    Laundry assistive person    Shopping assistive person              Patient Forms       Row Name 03/27/24 1414       Patient Forms    Important Message from Medicare (IMM) Delivered  IMM given by reg.                    Met with patient in room wearing PPE: mask.    Maintained distance greater than six feet and spent less than 15 minutes in the room.     Emelina Davis RN

## 2024-03-27 NOTE — DISCHARGE PLACEMENT REQUEST
"Stephanie Sol \"Nadia\" (83 y.o. Female)       Date of Birth   1940    Social Security Number       Address   [address] [place] [address]    Home Phone   [phone]    MRN   [id number]       Hoahaoism   Anabaptist    Marital Status                               Admission Date   3/26/24    Admission Type   Emergency    Admitting Provider   Nicole Denise MD    Attending Provider   Nicole Denise MD    Department, Room/Bed   [facility] EMERGENCY DEPARTMENT, 08/08       Discharge Date       Discharge Disposition       Discharge Destination                                 Attending Provider: Nicole Denies MD    Allergies: No Known Allergies    Isolation: None   Infection: None   Code Status: CPR    Ht: 157.5 cm (62\")   Wt: 61.2 kg (135 lb)    Admission Cmt: None   Principal Problem: CRISTIAN (acute kidney injury) [N17.9]                   Active Insurance as of 3/26/2024       Primary Coverage       Payor Plan Insurance Group Employer/Plan Group    MEDICARE MEDICARE A & B        Payor Plan Address Payor Plan Phone Number Payor Plan Fax Number Effective Dates    [address] [phone]  7/1/2007 - None Entered    [facility] [id number]         Subscriber Name Subscriber Birth Date Member ID       STEPHANIE SOL 1940 [id number]               Secondary Coverage       Payor Plan Insurance Group Employer/Plan Group    MISC COMMERCIAL MISC COMMERCIAL NGN       Coverage Address Coverage Phone Number Coverage Fax Number Effective Dates    [address] [address] [phone]  11/1/2022 - None Entered    [facility] [id number]         Subscriber Name Subscriber Birth Date Member ID       STEPHANIE SOL 1940 [id number]                     Emergency Contacts        (Rel.) Home Phone Work Phone Mobile Phone    KINGS SOL (Spouse) [phone] -- --              "

## 2024-03-28 ENCOUNTER — APPOINTMENT (OUTPATIENT)
Dept: MRI IMAGING | Facility: HOSPITAL | Age: 84
DRG: 683 | End: 2024-03-28
Payer: MEDICARE

## 2024-03-28 LAB
ANION GAP SERPL CALCULATED.3IONS-SCNC: 13 MMOL/L (ref 5–15)
BASOPHILS # BLD AUTO: 0.03 10*3/MM3 (ref 0–0.2)
BASOPHILS NFR BLD AUTO: 0.3 % (ref 0–1.5)
BILIRUB UR QL STRIP: NEGATIVE
BUN SERPL-MCNC: 30 MG/DL (ref 8–23)
BUN/CREAT SERPL: 23.1 (ref 7–25)
CALCIUM SPEC-SCNC: 10 MG/DL (ref 8.6–10.5)
CHLORIDE SERPL-SCNC: 100 MMOL/L (ref 98–107)
CLARITY UR: CLEAR
CO2 SERPL-SCNC: 23 MMOL/L (ref 22–29)
COLOR UR: YELLOW
CREAT SERPL-MCNC: 1.3 MG/DL (ref 0.57–1)
DEPRECATED RDW RBC AUTO: 55 FL (ref 37–54)
EGFRCR SERPLBLD CKD-EPI 2021: 41.4 ML/MIN/1.73
EOSINOPHIL # BLD AUTO: 0.16 10*3/MM3 (ref 0–0.4)
EOSINOPHIL NFR BLD AUTO: 1.7 % (ref 0.3–6.2)
ERYTHROCYTE [DISTWIDTH] IN BLOOD BY AUTOMATED COUNT: 15.7 % (ref 12.3–15.4)
FOLATE SERPL-MCNC: 6.71 NG/ML (ref 4.78–24.2)
GLUCOSE SERPL-MCNC: 90 MG/DL (ref 65–99)
GLUCOSE UR STRIP-MCNC: NEGATIVE MG/DL
HCT VFR BLD AUTO: 45.5 % (ref 34–46.6)
HGB BLD-MCNC: 14.4 G/DL (ref 12–15.9)
HGB UR QL STRIP.AUTO: NEGATIVE
IMM GRANULOCYTES # BLD AUTO: 0.12 10*3/MM3 (ref 0–0.05)
IMM GRANULOCYTES NFR BLD AUTO: 1.2 % (ref 0–0.5)
KETONES UR QL STRIP: NEGATIVE
LEUKOCYTE ESTERASE UR QL STRIP.AUTO: NEGATIVE
LYMPHOCYTES # BLD AUTO: 1.6 10*3/MM3 (ref 0.7–3.1)
LYMPHOCYTES NFR BLD AUTO: 16.6 % (ref 19.6–45.3)
MCH RBC QN AUTO: 30.5 PG (ref 26.6–33)
MCHC RBC AUTO-ENTMCNC: 31.6 G/DL (ref 31.5–35.7)
MCV RBC AUTO: 96.4 FL (ref 79–97)
MONOCYTES # BLD AUTO: 0.35 10*3/MM3 (ref 0.1–0.9)
MONOCYTES NFR BLD AUTO: 3.6 % (ref 5–12)
NEUTROPHILS NFR BLD AUTO: 7.37 10*3/MM3 (ref 1.7–7)
NEUTROPHILS NFR BLD AUTO: 76.6 % (ref 42.7–76)
NITRITE UR QL STRIP: NEGATIVE
NRBC BLD AUTO-RTO: 0 /100 WBC (ref 0–0.2)
PH UR STRIP.AUTO: <=5 [PH] (ref 5–8)
PLATELET # BLD AUTO: 168 10*3/MM3 (ref 140–450)
PMV BLD AUTO: 10.6 FL (ref 6–12)
POTASSIUM SERPL-SCNC: 4.6 MMOL/L (ref 3.5–5.2)
PROT UR QL STRIP: NEGATIVE
RBC # BLD AUTO: 4.72 10*6/MM3 (ref 3.77–5.28)
SODIUM SERPL-SCNC: 136 MMOL/L (ref 136–145)
SP GR UR STRIP: 1.01 (ref 1–1.03)
T4 FREE SERPL-MCNC: 1.1 NG/DL (ref 0.93–1.7)
TSH SERPL DL<=0.05 MIU/L-ACNC: 6.54 UIU/ML (ref 0.27–4.2)
UROBILINOGEN UR QL STRIP: NORMAL
VIT B12 BLD-MCNC: 289 PG/ML (ref 211–946)
WBC NRBC COR # BLD AUTO: 9.63 10*3/MM3 (ref 3.4–10.8)

## 2024-03-28 PROCEDURE — 36415 COLL VENOUS BLD VENIPUNCTURE: CPT

## 2024-03-28 PROCEDURE — 70551 MRI BRAIN STEM W/O DYE: CPT

## 2024-03-28 PROCEDURE — 82746 ASSAY OF FOLIC ACID SERUM: CPT | Performed by: PSYCHIATRY & NEUROLOGY

## 2024-03-28 PROCEDURE — 82607 VITAMIN B-12: CPT | Performed by: PSYCHIATRY & NEUROLOGY

## 2024-03-28 PROCEDURE — 84439 ASSAY OF FREE THYROXINE: CPT | Performed by: PSYCHIATRY & NEUROLOGY

## 2024-03-28 PROCEDURE — 80048 BASIC METABOLIC PNL TOTAL CA: CPT | Performed by: INTERNAL MEDICINE

## 2024-03-28 PROCEDURE — 84443 ASSAY THYROID STIM HORMONE: CPT | Performed by: PSYCHIATRY & NEUROLOGY

## 2024-03-28 PROCEDURE — 25810000003 SODIUM CHLORIDE 0.9 % SOLUTION

## 2024-03-28 PROCEDURE — 72141 MRI NECK SPINE W/O DYE: CPT

## 2024-03-28 PROCEDURE — 85025 COMPLETE CBC W/AUTO DIFF WBC: CPT

## 2024-03-28 PROCEDURE — 81003 URINALYSIS AUTO W/O SCOPE: CPT | Performed by: INTERNAL MEDICINE

## 2024-03-28 RX ADMIN — SODIUM CHLORIDE 100 ML/HR: 9 INJECTION, SOLUTION INTRAVENOUS at 08:55

## 2024-03-28 RX ADMIN — ATORVASTATIN CALCIUM 40 MG: 40 TABLET, FILM COATED ORAL at 20:30

## 2024-03-28 RX ADMIN — ALLOPURINOL 100 MG: 100 TABLET ORAL at 08:56

## 2024-03-28 RX ADMIN — ASPIRIN 325 MG ORAL TABLET 325 MG: 325 PILL ORAL at 08:56

## 2024-03-28 RX ADMIN — Medication 5 MG: at 20:29

## 2024-03-28 RX ADMIN — HYDROCHLOROTHIAZIDE 25 MG: 25 TABLET ORAL at 08:56

## 2024-03-28 RX ADMIN — ESCITALOPRAM OXALATE 10 MG: 10 TABLET ORAL at 08:56

## 2024-03-28 RX ADMIN — Medication 10 ML: at 08:56

## 2024-03-28 RX ADMIN — LISINOPRIL 20 MG: 20 TABLET ORAL at 08:56

## 2024-03-28 NOTE — PLAN OF CARE
Problem: Adult Inpatient Plan of Care  Goal: Plan of Care Review  Outcome: Ongoing, Progressing  Flowsheets (Taken 3/28/2024 1440)  Progress: no change  Plan of Care Reviewed With:   patient   spouse  Goal: Patient-Specific Goal (Individualized)  Outcome: Ongoing, Progressing  Goal: Absence of Hospital-Acquired Illness or Injury  Outcome: Ongoing, Progressing  Intervention: Identify and Manage Fall Risk  Recent Flowsheet Documentation  Taken 3/28/2024 1438 by Sharon Alcantara LPN  Safety Promotion/Fall Prevention:   assistive device/personal items within reach   clutter free environment maintained   fall prevention program maintained   nonskid shoes/slippers when out of bed   safety round/check completed   room organization consistent  Taken 3/28/2024 1223 by Sharon Alcantara LPN  Safety Promotion/Fall Prevention:   assistive device/personal items within reach   clutter free environment maintained   fall prevention program maintained   nonskid shoes/slippers when out of bed   safety round/check completed   room organization consistent  Taken 3/28/2024 1029 by Sharon Alcantara LPN  Safety Promotion/Fall Prevention: patient off unit  Taken 3/28/2024 0800 by Sharon Alcantara LPN  Safety Promotion/Fall Prevention:   assistive device/personal items within reach   clutter free environment maintained   fall prevention program maintained   nonskid shoes/slippers when out of bed   safety round/check completed   room organization consistent  Intervention: Prevent Skin Injury  Recent Flowsheet Documentation  Taken 3/28/2024 1223 by Sharon Alcantara LPN  Body Position: position changed independently  Skin Protection:   adhesive use limited   tubing/devices free from skin contact  Taken 3/28/2024 0800 by Sharon Alcantara LPN  Body Position: position changed independently  Skin Protection:   adhesive use limited   tubing/devices free from skin contact  Intervention: Prevent and Manage VTE (Venous Thromboembolism) Risk  Recent Flowsheet  Documentation  Taken 3/28/2024 1223 by Sharon Alcantara LPN  Activity Management: back to bed  VTE Prevention/Management:   patient refused intervention   sequential compression devices off  Range of Motion: active ROM (range of motion) encouraged  Taken 3/28/2024 0800 by Sharon Alcantara LPN  Activity Management: up in chair  VTE Prevention/Management: sequential compression devices off  Range of Motion: active ROM (range of motion) encouraged  Intervention: Prevent Infection  Recent Flowsheet Documentation  Taken 3/28/2024 1438 by Sharon Alcantara LPN  Infection Prevention:   environmental surveillance performed   hand hygiene promoted   rest/sleep promoted   single patient room provided  Taken 3/28/2024 1223 by Sharon Alcantara LPN  Infection Prevention:   environmental surveillance performed   hand hygiene promoted   rest/sleep promoted   single patient room provided  Taken 3/28/2024 0800 by Sharon Alcantara LPN  Infection Prevention:   environmental surveillance performed   hand hygiene promoted   rest/sleep promoted   single patient room provided  Goal: Optimal Comfort and Wellbeing  Outcome: Ongoing, Progressing  Intervention: Provide Person-Centered Care  Recent Flowsheet Documentation  Taken 3/28/2024 1223 by Sharon Alcantara LPN  Trust Relationship/Rapport:   care explained   thoughts/feelings acknowledged  Taken 3/28/2024 0800 by Sharon Alcantara LPN  Trust Relationship/Rapport:   care explained   thoughts/feelings acknowledged  Goal: Readiness for Transition of Care  Outcome: Ongoing, Progressing     Problem: Confusion Chronic  Goal: Optimal Cognitive Function  Outcome: Ongoing, Progressing  Intervention: Minimize and Manage Confusion  Recent Flowsheet Documentation  Taken 3/28/2024 1223 by Sharon Alcantara LPN  Sensory Stimulation Regulation: care clustered  Reorientation Measures: clock in view  Family/Support System Care:   caregiver stress acknowledged   support provided  Taken 3/28/2024 0800 by Sharon Alcantara  LPN  Sensory Stimulation Regulation:   care clustered   quiet environment promoted  Reorientation Measures: clock in view  Environmental Support: calm environment promoted  Family/Support System Care:   caregiver stress acknowledged   support provided  Intervention: Minimize Injury Risk and Provide Safety  Recent Flowsheet Documentation  Taken 3/28/2024 1438 by Sharon Alcantara LPN  Enhanced Safety Measures: bed alarm set  Taken 3/28/2024 1223 by Sharon Alcantara LPN  Enhanced Safety Measures: bed alarm set  Taken 3/28/2024 0800 by Sharon Alcantara LPN  Enhanced Safety Measures: bed alarm set     Problem: Skin Injury Risk Increased  Goal: Skin Health and Integrity  Outcome: Ongoing, Progressing  Intervention: Optimize Skin Protection  Recent Flowsheet Documentation  Taken 3/28/2024 1223 by Sharon Alcantara LPN  Pressure Reduction Techniques: weight shift assistance provided  Head of Bed (HOB) Positioning: HOB elevated  Pressure Reduction Devices: pressure-redistributing mattress utilized  Skin Protection:   adhesive use limited   tubing/devices free from skin contact  Taken 3/28/2024 0800 by Sharon Alcantara LPN  Pressure Reduction Techniques: weight shift assistance provided  Head of Bed (HOB) Positioning: HOB elevated  Pressure Reduction Devices: pressure-redistributing mattress utilized  Skin Protection:   adhesive use limited   tubing/devices free from skin contact     Problem: Fall Injury Risk  Goal: Absence of Fall and Fall-Related Injury  Outcome: Ongoing, Progressing  Intervention: Identify and Manage Contributors  Recent Flowsheet Documentation  Taken 3/28/2024 1438 by Sharon Alcantara LPN  Medication Review/Management: medications reviewed  Taken 3/28/2024 1223 by Sharon Alcantara LPN  Medication Review/Management: medications reviewed  Taken 3/28/2024 0800 by Sharon Alcantara LPN  Medication Review/Management: medications reviewed  Intervention: Promote Injury-Free Environment  Recent Flowsheet Documentation  Taken  3/28/2024 1438 by Sharon Alcantara LPN  Safety Promotion/Fall Prevention:   assistive device/personal items within reach   clutter free environment maintained   fall prevention program maintained   nonskid shoes/slippers when out of bed   safety round/check completed   room organization consistent  Taken 3/28/2024 1223 by Sharon Alcantara LPN  Safety Promotion/Fall Prevention:   assistive device/personal items within reach   clutter free environment maintained   fall prevention program maintained   nonskid shoes/slippers when out of bed   safety round/check completed   room organization consistent  Taken 3/28/2024 1029 by Sharon Alcantara LPN  Safety Promotion/Fall Prevention: patient off unit  Taken 3/28/2024 0800 by Sharon Alcantara LPN  Safety Promotion/Fall Prevention:   assistive device/personal items within reach   clutter free environment maintained   fall prevention program maintained   nonskid shoes/slippers when out of bed   safety round/check completed   room organization consistent   Goal Outcome Evaluation:  Plan of Care Reviewed With: patient, spouse        Progress: no change

## 2024-03-28 NOTE — CONSULTS
Nephrology  follow up note     Subjective     History of Present Illness:  Patient is a 83 y.o. female  with a previous medical history of HTN, SSS - S/P pacemaker, Depression and GERD who presented to [facility]/26/[facility]24 due to altered mental status and weakness.Patient report  not feeling herself in the last few days.  She said she been taking Ibuprofen for long time that was started by her PCP and she is not sure why. She also started developing rash all over her body for the last 3 days.  She denies any other complaints. Labs shows  Sodium is 135, Potassium 5.6, Creatinine 2.1 (baseline 1.13). Otherwise, labs are unremarkable.  UA shows trace protein.  She is afebrile, all vitals are stable.        Interval Hx:   No acute events overnight   Cr continue to improve to 1.3           History reviewed. No pertinent family history.  Social History     Socioeconomic History    Marital status:    Tobacco Use    Smoking status: Never    Smokeless tobacco: Never   Vaping Use    Vaping status: Never Used   Substance and Sexual Activity    Alcohol use: Never    Drug use: Never    Sexual activity: Defer     Past Medical History:   Diagnosis Date    Essential hypertension 11/26/2022    Gout, unspecified 3/18/2022    Major depressive disorder, single episode, unspecified 3/18/2022    Presence of cardiac pacemaker 3/18/2022    Sick sinus syndrome 3/18/2022     Past Surgical History:   Procedure Laterality Date    CARDIAC ELECTROPHYSIOLOGY PROCEDURE N/A 11/30/2022    Procedure: Biv pacemaker upgradeBiotronik;  Surgeon: Karin Pedersen MD;  Location: [facility] INVASIVE LOCATION;  Service: Cardiovascular;  Laterality: N/A;     Medications Prior to Admission   Medication Sig Dispense Refill Last Dose    allopurinol (ZYLOPRIM) 100 MG tablet Take 1 tablet by mouth Daily.       escitalopram (LEXAPRO) 10 MG tablet Take 1 tablet by mouth Daily.       lisinopril-hydrochlorothiazide (PRINZIDE,ZESTORETIC) 20-25  "MG per tablet Take 1 tablet by mouth Daily.        Patient has no known allergies.    Objective     Review of Systems\"   10 ROS obtained and negative except what mentioned on HPI         Physical Exam:    Constitutional:       Appearance: Normal appearance.   HENT:      Mouth/Throat:      Mouth: Mucous membranes are moist.   Cardiovascular:      Rate and Rhythm: Normal rate and regular rhythm.   Pulmonary:      Effort: Pulmonary effort is normal.      Breath sounds: Normal breath sounds.   Abdominal:      General: Bowel sounds are normal.      Palpations: Abdomen is soft.   Musculoskeletal:         General: Normal range of motion   Skin: Rash noted over her body      Assessment & Plan       # CRISTIAN - likely AIN from Ibuprofen She said she been taking Ibuprofen for long time that was started by her PCP and she is not sure why. She also started developing rash all over her body for the last 3 days   (baseline 1.13).    # Hyperkalemia in the setting of CRISTIAN   # AMS     Plan:   Hold ibuprofen   Agree with IVF. Continue with a rate of 100 cc/h    kidney US  noted   Pending  UPCR   Solumedrol x1 ordered   Discussed biopsy with the patient   Pending urine Na and Cr  and evaluate FeNa   K normalized following lokelma   Strict I/O         I discussed with the patient/legal representative the risks and the benefits associated with the proposed procedure(s).  The patient/legal representative has been given the opportunity to ask questions.  Alternatives to the proposed treatment (s) were discussed, including the likely results of no treatment.  The patient/legal representative wishes to proceed.       Bridgette Jordan MD  03/28/24  16:10 EDT        "

## 2024-03-28 NOTE — PLAN OF CARE
Problem: Adult Inpatient Plan of Care  Goal: Plan of Care Review  Outcome: Ongoing, Progressing  Goal: Patient-Specific Goal (Individualized)  Outcome: Ongoing, Progressing  Goal: Absence of Hospital-Acquired Illness or Injury  Outcome: Ongoing, Progressing  Intervention: Identify and Manage Fall Risk  Recent Flowsheet Documentation  Taken 3/28/2024 0600 by Sandy Romero RN  Safety Promotion/Fall Prevention:   activity supervised   assistive device/personal items within reach   clutter free environment maintained   fall prevention program maintained   safety round/check completed   room organization consistent  Taken 3/28/2024 0400 by Sandy Romero RN  Safety Promotion/Fall Prevention:   activity supervised   assistive device/personal items within reach   clutter free environment maintained   fall prevention program maintained   safety round/check completed   room organization consistent  Taken 3/28/2024 0200 by Sandy Romero RN  Safety Promotion/Fall Prevention:   activity supervised   assistive device/personal items within reach   clutter free environment maintained   fall prevention program maintained   safety round/check completed   room organization consistent  Taken 3/28/2024 0000 by Sandy Romero RN  Safety Promotion/Fall Prevention:   activity supervised   assistive device/personal items within reach   clutter free environment maintained   fall prevention program maintained   safety round/check completed   room organization consistent  Taken 3/27/2024 2239 by Sandy Romero RN  Safety Promotion/Fall Prevention:   activity supervised   assistive device/personal items within reach   clutter free environment maintained   fall prevention program maintained   safety round/check completed   room organization consistent  Intervention: Prevent Skin Injury  Recent Flowsheet Documentation  Taken 3/28/2024 0400 by Sandy Romero RN  Body Position: position changed independently  Skin  Protection:   adhesive use limited   tubing/devices free from skin contact   transparent dressing maintained  Taken 3/28/2024 0000 by Sandy Romero RN  Body Position: weight shifting  Skin Protection:   adhesive use limited   tubing/devices free from skin contact   transparent dressing maintained  Taken 3/27/2024 2239 by Sandy Romero RN  Body Position: weight shifting  Skin Protection:   adhesive use limited   tubing/devices free from skin contact   transparent dressing maintained  Intervention: Prevent and Manage VTE (Venous Thromboembolism) Risk  Recent Flowsheet Documentation  Taken 3/28/2024 0400 by Sandy Romero RN  VTE Prevention/Management: sequential compression devices on  Range of Motion: active ROM (range of motion) encouraged  Taken 3/28/2024 0000 by Sandy Romero RN  VTE Prevention/Management: sequential compression devices on  Range of Motion: active ROM (range of motion) encouraged  Taken 3/27/2024 2239 by Sandy Romero RN  Range of Motion: active ROM (range of motion) encouraged  Intervention: Prevent Infection  Recent Flowsheet Documentation  Taken 3/28/2024 0600 by Sandy Romero RN  Infection Prevention:   environmental surveillance performed   equipment surfaces disinfected   hand hygiene promoted   personal protective equipment utilized   rest/sleep promoted   single patient room provided  Taken 3/28/2024 0400 by Sandy Romero RN  Infection Prevention:   environmental surveillance performed   equipment surfaces disinfected   hand hygiene promoted   personal protective equipment utilized   rest/sleep promoted   single patient room provided  Taken 3/28/2024 0200 by Sandy Romero RN  Infection Prevention:   environmental surveillance performed   equipment surfaces disinfected   hand hygiene promoted   personal protective equipment utilized   rest/sleep promoted   single patient room provided  Taken 3/28/2024 0000 by Sandy Romero RN  Infection  Prevention:   environmental surveillance performed   equipment surfaces disinfected   hand hygiene promoted   personal protective equipment utilized   rest/sleep promoted   single patient room provided  Taken 3/27/2024 2239 by Sandy Romero RN  Infection Prevention:   environmental surveillance performed   equipment surfaces disinfected   hand hygiene promoted   personal protective equipment utilized   rest/sleep promoted   single patient room provided  Goal: Optimal Comfort and Wellbeing  Outcome: Ongoing, Progressing  Intervention: Provide Person-Centered Care  Recent Flowsheet Documentation  Taken 3/27/2024 2239 by Sandy Romero RN  Trust Relationship/Rapport:   care explained   choices provided   thoughts/feelings acknowledged   reassurance provided   questions answered  Goal: Readiness for Transition of Care  Outcome: Ongoing, Progressing  Intervention: Mutually Develop Transition Plan  Recent Flowsheet Documentation  Taken 3/27/2024 2232 by Sandy Romero RN  Equipment Currently Used at Home:   walker, rolling   cane, quad     Problem: Confusion Chronic  Goal: Optimal Cognitive Function  Outcome: Ongoing, Progressing  Intervention: Minimize and Manage Confusion  Recent Flowsheet Documentation  Taken 3/28/2024 0400 by Sandy Romero, RN  Reorientation Measures: clock in view  Taken 3/28/2024 0000 by Sandy Romero RN  Reorientation Measures: clock in view  Taken 3/27/2024 2239 by Sandy Romero RN  Sensory Stimulation Regulation: quiet environment promoted  Reorientation Measures: clock in view  Environmental Support: calm environment promoted  Family/Support System Care:   support provided   self-care encouraged  Intervention: Minimize Injury Risk and Provide Safety  Recent Flowsheet Documentation  Taken 3/28/2024 0600 by Sandy Romero, RN  Enhanced Safety Measures: bed alarm set  Taken 3/28/2024 0400 by Sandy Romero RN  Enhanced Safety Measures: bed alarm set  Taken  3/28/2024 0200 by Sandy Romero RN  Enhanced Safety Measures: bed alarm set  Taken 3/28/2024 0000 by Sandy Romero RN  Enhanced Safety Measures:   bed alarm set   room near unit station  Taken 3/27/2024 2239 by Sandy Romero RN  Enhanced Safety Measures: bed alarm set     Problem: Skin Injury Risk Increased  Goal: Skin Health and Integrity  Outcome: Ongoing, Progressing  Intervention: Optimize Skin Protection  Recent Flowsheet Documentation  Taken 3/28/2024 0400 by Sandy Romero RN  Pressure Reduction Techniques: weight shift assistance provided  Head of Bed (HOB) Positioning: [facility] elevated  Pressure Reduction Devices: pressure-redistributing mattress utilized  Skin Protection:   adhesive use limited   tubing/devices free from skin contact   transparent dressing maintained  Taken 3/28/2024 0000 by Sandy Romero RN  Pressure Reduction Techniques: weight shift assistance provided  Head of Bed (HOB) Positioning: HOB elevated  Pressure Reduction Devices: pressure-redistributing mattress utilized  Skin Protection:   adhesive use limited   tubing/devices free from skin contact   transparent dressing maintained  Taken 3/27/2024 2239 by Sandy Romero RN  Pressure Reduction Techniques: weight shift assistance provided  Head of Bed (HOB) Positioning: HOB elevated  Pressure Reduction Devices: pressure-redistributing mattress utilized  Skin Protection:   adhesive use limited   tubing/devices free from skin contact   transparent dressing maintained     Problem: Fall Injury Risk  Goal: Absence of Fall and Fall-Related Injury  Outcome: Ongoing, Progressing  Intervention: Promote Injury-Free Environment  Recent Flowsheet Documentation  Taken 3/28/2024 0600 by Sandy Romero RN  Safety Promotion/Fall Prevention:   activity supervised   assistive device/personal items within reach   clutter free environment maintained   fall prevention program maintained   safety round/check completed   room  organization consistent  Taken 3/28/2024 0400 by Sandy Romero RN  Safety Promotion/Fall Prevention:   activity supervised   assistive device/personal items within reach   clutter free environment maintained   fall prevention program maintained   safety round/check completed   room organization consistent  Taken 3/28/2024 0200 by Sandy Romero RN  Safety Promotion/Fall Prevention:   activity supervised   assistive device/personal items within reach   clutter free environment maintained   fall prevention program maintained   safety round/check completed   room organization consistent  Taken 3/28/2024 0000 by Sandy Romero RN  Safety Promotion/Fall Prevention:   activity supervised   assistive device/personal items within reach   clutter free environment maintained   fall prevention program maintained   safety round/check completed   room organization consistent  Taken 3/27/2024 2239 by Sandy Romero RN  Safety Promotion/Fall Prevention:   activity supervised   assistive device/personal items within reach   clutter free environment maintained   fall prevention program maintained   safety round/check completed   room organization consistent   Goal Outcome Evaluation:

## 2024-03-28 NOTE — PROGRESS NOTES
Patient doing very well otherwise    Still continues to have some left shoulder area reluctance to move.  There is no pain and there is no passive range of motion pain    No cranial nerve signs and symptoms    MRI brain was unremarkable    This has been going on for some time    So my recommendation was that if she is being discharged to have follow-up with neurology and consider cervical spine studies but if she is staying here we may do it now and she may need nerve conduction and EMGs  But this is not stroke or TIA    I talked to her      

## 2024-03-29 LAB
ANION GAP SERPL CALCULATED.3IONS-SCNC: 12 MMOL/L (ref 5–15)
BASOPHILS # BLD AUTO: 0.03 10*3/MM3 (ref 0–0.2)
BASOPHILS NFR BLD AUTO: 0.3 % (ref 0–1.5)
BUN SERPL-MCNC: 27 MG/DL (ref 8–23)
BUN/CREAT SERPL: 25.2 (ref 7–25)
CALCIUM SPEC-SCNC: 9 MG/DL (ref 8.6–10.5)
CHLORIDE SERPL-SCNC: 104 MMOL/L (ref 98–107)
CO2 SERPL-SCNC: 16 MMOL/L (ref 22–29)
CREAT SERPL-MCNC: 1.07 MG/DL (ref 0.57–1)
DEPRECATED RDW RBC AUTO: 53.9 FL (ref 37–54)
EGFRCR SERPLBLD CKD-EPI 2021: 52.3 ML/MIN/1.73
EOSINOPHIL # BLD AUTO: 0.17 10*3/MM3 (ref 0–0.4)
EOSINOPHIL NFR BLD AUTO: 1.8 % (ref 0.3–6.2)
ERYTHROCYTE [DISTWIDTH] IN BLOOD BY AUTOMATED COUNT: 15.3 % (ref 12.3–15.4)
GLUCOSE SERPL-MCNC: 77 MG/DL (ref 65–99)
HCT VFR BLD AUTO: 41.9 % (ref 34–46.6)
HGB BLD-MCNC: 13.4 G/DL (ref 12–15.9)
IMM GRANULOCYTES # BLD AUTO: 0.07 10*3/MM3 (ref 0–0.05)
IMM GRANULOCYTES NFR BLD AUTO: 0.7 % (ref 0–0.5)
LYMPHOCYTES # BLD AUTO: 2.47 10*3/MM3 (ref 0.7–3.1)
LYMPHOCYTES NFR BLD AUTO: 26.1 % (ref 19.6–45.3)
MCH RBC QN AUTO: 30.4 PG (ref 26.6–33)
MCHC RBC AUTO-ENTMCNC: 32 G/DL (ref 31.5–35.7)
MCV RBC AUTO: 95 FL (ref 79–97)
MONOCYTES # BLD AUTO: 0.69 10*3/MM3 (ref 0.1–0.9)
MONOCYTES NFR BLD AUTO: 7.3 % (ref 5–12)
NEUTROPHILS NFR BLD AUTO: 6.04 10*3/MM3 (ref 1.7–7)
NEUTROPHILS NFR BLD AUTO: 63.8 % (ref 42.7–76)
NRBC BLD AUTO-RTO: 0 /100 WBC (ref 0–0.2)
PLATELET # BLD AUTO: 148 10*3/MM3 (ref 140–450)
PMV BLD AUTO: 11.3 FL (ref 6–12)
POTASSIUM SERPL-SCNC: 4.7 MMOL/L (ref 3.5–5.2)
RBC # BLD AUTO: 4.41 10*6/MM3 (ref 3.77–5.28)
SODIUM SERPL-SCNC: 132 MMOL/L (ref 136–145)
SODIUM UR-SCNC: 102 MMOL/L
WBC NRBC COR # BLD AUTO: 9.47 10*3/MM3 (ref 3.4–10.8)

## 2024-03-29 PROCEDURE — 25810000003 SODIUM CHLORIDE 0.9 % SOLUTION

## 2024-03-29 PROCEDURE — 97535 SELF CARE MNGMENT TRAINING: CPT

## 2024-03-29 PROCEDURE — 82570 ASSAY OF URINE CREATININE: CPT | Performed by: STUDENT IN AN ORGANIZED HEALTH CARE EDUCATION/TRAINING PROGRAM

## 2024-03-29 PROCEDURE — 85025 COMPLETE CBC W/AUTO DIFF WBC: CPT

## 2024-03-29 PROCEDURE — 97530 THERAPEUTIC ACTIVITIES: CPT

## 2024-03-29 PROCEDURE — 84300 ASSAY OF URINE SODIUM: CPT | Performed by: STUDENT IN AN ORGANIZED HEALTH CARE EDUCATION/TRAINING PROGRAM

## 2024-03-29 PROCEDURE — 99222 1ST HOSP IP/OBS MODERATE 55: CPT

## 2024-03-29 PROCEDURE — 80048 BASIC METABOLIC PNL TOTAL CA: CPT

## 2024-03-29 PROCEDURE — 97112 NEUROMUSCULAR REEDUCATION: CPT

## 2024-03-29 PROCEDURE — 97116 GAIT TRAINING THERAPY: CPT

## 2024-03-29 PROCEDURE — 84156 ASSAY OF PROTEIN URINE: CPT | Performed by: STUDENT IN AN ORGANIZED HEALTH CARE EDUCATION/TRAINING PROGRAM

## 2024-03-29 RX ADMIN — ESCITALOPRAM OXALATE 10 MG: 10 TABLET ORAL at 08:49

## 2024-03-29 RX ADMIN — Medication 10 ML: at 08:57

## 2024-03-29 RX ADMIN — ASPIRIN 325 MG ORAL TABLET 325 MG: 325 PILL ORAL at 08:49

## 2024-03-29 RX ADMIN — ALLOPURINOL 100 MG: 100 TABLET ORAL at 08:49

## 2024-03-29 RX ADMIN — SODIUM CHLORIDE 100 ML/HR: 9 INJECTION, SOLUTION INTRAVENOUS at 08:49

## 2024-03-29 RX ADMIN — LISINOPRIL 20 MG: 20 TABLET ORAL at 08:55

## 2024-03-29 RX ADMIN — Medication 10 ML: at 22:06

## 2024-03-29 RX ADMIN — HYDROCHLOROTHIAZIDE 25 MG: 25 TABLET ORAL at 08:55

## 2024-03-29 NOTE — THERAPY TREATMENT NOTE
"Subjective: Pt agreeable to therapeutic plan of care.    Objective:     Bed mobility - Mod-A supine to sit.   Transfers - Mod-A x1 or Shira x2p for sit to stand from bed and BSC. With RW  Static sitting - initially with posterior lean but then corrects after tactile cues. Static sitting with SBA EOB with feet on the floor.   Standing balance activity - Static standing with RW with posterior lean and Shira x2p at times and modA x1 at other times. PT cues for anterior wt shift but with difficulty following commands. Target provided anteriorly and pt cued to reach to it - Balance momentarily improves after 4 reps of anterior reaching.   Ambulation - 15 feet Min-A and Assist x 2 with RW    Vitals: WNL    Pain:  no pain complaints.     Education: Provided education on the importance of mobility in the acute care setting, Verbal/Tactile Cues, Transfer Training, and Gait Training, balance training .    Assessment: Stephanie Sol presents with good motivation and tolerates 25 minutes of activity without complaints. Two person assist provided today due to RN reports of pt with increased assist needs due to poor balance. Pt demos posterior lean.   Pt is safe to complete mobility tasks with 1 person assist with RW and modA. Pt with balance, endurance, strength, and functional mobility impairments which indicate the need for skilled intervention. Tolerating session today without incident. Will continue to follow and progress as tolerated.     Plan/Recommendations:   If medically appropriate, Moderate Intensity Therapy recommended post-acute care. This is recommended as therapy feels the patient would require 3-4 days per week and wouldn't tolerate \"3 hour daily\" rehab intensity. SNF would be the preferred choice. If the patient does not agree to SNF, arrange HH or OP depending on home bound status. If patient is medically complex, consider LTACH. Pt requires no DME at discharge.     Pt desires Skilled Rehab placement at " discharge. Pt cooperative; agreeable to therapeutic recommendations and plan of care.         Basic Mobility 6-click:  Rollin = Total, A lot = 2, A little = 3; 4 = None  Supine>Sit:   1 = Total, A lot = 2, A little = 3; 4 = None   Sit>Stand with arms:  1 = Total, A lot = 2, A little = 3; 4 = None  Bed>Chair:   1 = Total, A lot = 2, A little = 3; 4 = None  Ambulate in room:  1 = Total, A lot = 2, A little = 3; 4 = None  3-5 Steps with railin = Total, A lot = 2, A little = 3; 4 = None  Score: 11    Modified [place]: N/A = No pre-op stroke/TIA    Post-Tx Position: Up in Chair, Staff Present, Alarms activated, and Call light and personal items within reach  PPE: gloves

## 2024-03-29 NOTE — PLAN OF CARE
Problem: Adult Inpatient Plan of Care  Goal: Plan of Care Review  Outcome: Ongoing, Progressing  Flowsheets (Taken 3/29/2024 1326)  Progress: no change  Plan of Care Reviewed With:   patient   spouse  Goal: Patient-Specific Goal (Individualized)  Outcome: Ongoing, Progressing  Goal: Absence of Hospital-Acquired Illness or Injury  Outcome: Ongoing, Progressing  Intervention: Identify and Manage Fall Risk  Recent Flowsheet Documentation  Taken 3/29/2024 1152 by [place], Earlene A, LPN  Safety Promotion/Fall Prevention:   activity supervised   assistive device/personal items within reach   clutter free environment maintained   fall prevention program maintained   nonskid shoes/slippers when out of bed   room organization consistent   safety round/check completed  Taken 3/29/2024 1000 by Graff, Earlene A, LPN  Safety Promotion/Fall Prevention:   activity supervised   assistive device/personal items within reach   clutter free environment maintained   fall prevention program maintained   nonskid shoes/slippers when out of bed   room organization consistent   safety round/check completed  Taken 3/29/2024 0854 by Graff, Earlene A, LPN  Safety Promotion/Fall Prevention:   activity supervised   assistive device/personal items within reach   clutter free environment maintained   fall prevention program maintained   nonskid shoes/slippers when out of bed   room organization consistent   safety round/check completed  Intervention: Prevent Skin Injury  Recent Flowsheet Documentation  Taken 3/29/2024 0854 by Earlene Leon LPN  Skin Protection:   adhesive use limited   tubing/devices free from skin contact  Intervention: Prevent and Manage VTE (Venous Thromboembolism) Risk  Recent Flowsheet Documentation  Taken 3/29/2024 1152 by Earlene Leon LPN  VTE Prevention/Management:   bilateral   sequential compression devices off   patient refused intervention  Taken 3/29/2024 0854 by Earlene Leon LPN  VTE  Prevention/Management:   bilateral   sequential compression devices off   patient refused intervention  Range of Motion: active ROM (range of motion) encouraged  Intervention: Prevent Infection  Recent Flowsheet Documentation  Taken 3/29/2024 1152 by Earlene Leon LPN  Infection Prevention: hand hygiene promoted  Taken 3/29/2024 1000 by Earlene Leon LPN  Infection Prevention: hand hygiene promoted  Taken 3/29/2024 0854 by Earlene Leon LPN  Infection Prevention: hand hygiene promoted  Goal: Optimal Comfort and Wellbeing  Outcome: Ongoing, Progressing  Intervention: Provide Person-Centered Care  Recent Flowsheet Documentation  Taken 3/29/2024 1152 by Earlene Leon LPN  Trust Relationship/Rapport:   care explained   thoughts/feelings acknowledged  Taken 3/29/2024 0854 by Earlene Leon LPN  Trust Relationship/Rapport:   care explained   thoughts/feelings acknowledged  Goal: Readiness for Transition of Care  Outcome: Ongoing, Progressing     Problem: Confusion Chronic  Goal: Optimal Cognitive Function  Outcome: Ongoing, Progressing  Intervention: Minimize and Manage Confusion  Recent Flowsheet Documentation  Taken 3/29/2024 1152 by Earlene Leon LPN  Family/Support System Care:   caregiver stress acknowledged   self-care encouraged  Taken 3/29/2024 0854 by Earlene Leon LPN  Sensory Stimulation Regulation: care clustered  Reorientation Measures: clock in view  Family/Support System Care: self-care encouraged  Intervention: Minimize Injury Risk and Provide Safety  Recent Flowsheet Documentation  Taken 3/29/2024 1152 by Earlene Leon LPN  Enhanced Safety Measures: bed alarm set  Taken 3/29/2024 1000 by Earlene Leon LPN  Enhanced Safety Measures: bed alarm set  Taken 3/29/2024 0854 by Earlene Leon LPN  Enhanced Safety Measures: bed alarm set     Problem: Skin Injury Risk Increased  Goal: Skin Health and Integrity  Outcome: Ongoing, Progressing  Intervention:  Optimize Skin Protection  Recent Flowsheet Documentation  Taken 3/29/2024 0854 by Earlene Leon LPN  Pressure Reduction Techniques:   frequent weight shift encouraged   weight shift assistance provided  Pressure Reduction Devices:   positioning supports utilized   pressure-redistributing mattress utilized  Skin Protection:   adhesive use limited   tubing/devices free from skin contact     Problem: Fall Injury Risk  Goal: Absence of Fall and Fall-Related Injury  Outcome: Ongoing, Progressing  Intervention: Identify and Manage Contributors  Recent Flowsheet Documentation  Taken 3/29/2024 1152 by Earlene Leon LPN  Medication Review/Management: medications reviewed  Taken 3/29/2024 1000 by Earlene Leon LPN  Medication Review/Management: medications reviewed  Taken 3/29/2024 0854 by Earlene Leon LPN  Medication Review/Management: medications reviewed  Intervention: Promote Injury-Free Environment  Recent Flowsheet Documentation  Taken 3/29/2024 1152 by [place], Earlene A, LPN  Safety Promotion/Fall Prevention:   activity supervised   assistive device/personal items within reach   clutter free environment maintained   fall prevention program maintained   nonskid shoes/slippers when out of bed   room organization consistent   safety round/check completed  Taken 3/29/2024 1000 by [place], Earlene A, LPN  Safety Promotion/Fall Prevention:   activity supervised   assistive device/personal items within reach   clutter free environment maintained   fall prevention program maintained   nonskid shoes/slippers when out of bed   room organization consistent   safety round/check completed  Taken 3/29/2024 0854 by [place], Earlene A, LPN  Safety Promotion/Fall Prevention:   activity supervised   assistive device/personal items within reach   clutter free environment maintained   fall prevention program maintained   nonskid shoes/slippers when out of bed   room organization consistent   safety round/check  completed   Goal Outcome Evaluation:  Plan of Care Reviewed With: patient, spouse        Progress: no change

## 2024-03-29 NOTE — CASE MANAGEMENT/SOCIAL WORK
Continued Stay Note  CAROLANN Daly     Patient Name: Stephanie Sol  MRN: [id number]  Today's Date: 3/29/2024    Admit Date: 3/26/2024    Plan: [facility] accepted. Precert not required. PASRR required.   Discharge Plan       Row Name 03/29/24 1544       Plan    Plan Comments Barrier to D/C: Confusion; NS consult for severe cervical stenosis               Expected Discharge Date and Time       Expected Discharge Date Expected Discharge Time    Mar 31, 2024               Key Gray RN

## 2024-03-29 NOTE — PLAN OF CARE
"Assessment: Stephanie Sol presents with ADL impairments affecting function including balance, cognition, coordination, postural / trunk control, and strength. Demonstrated functioning below baseline abilities indicate the need for continued skilled intervention while inpatient. Pt continues to lose balance posteriorly, but responds well to verbal and tactile cues for correction. Assisted to [facility] and was able to ambulate around bed this date with assist x2. PT making good progress but remains well below her baseline. Tolerating session today without incident. Will continue to follow and progress as tolerated.      Plan/Recommendations:   Moderate Intensity Therapy recommended post-acute care. This is recommended as therapy feels the patient would require 3-4 days per week and wouldn't tolerate \"3 hour daily\" rehab intensity. SNF would be the preferred choice. If the patient does not agree to SNF, arrange HH or OP depending on home bound status. If patient is medically complex, consider LTACH.. Pt requires no DME at discharge.      Pt desires Skilled Rehab placement at discharge. Pt cooperative; agreeable to therapeutic recommendations and plan of care.        "

## 2024-03-29 NOTE — PLAN OF CARE
Assessment: Stephanie Sol presents with good motivation and tolerates 25 minutes of activity without complaints. Two person assist provided today due to RN reports of pt with increased assist needs due to poor balance. Pt demos posterior lean.   Pt is safe to complete mobility tasks with 1 person assist with RW and modA. Pt with balance, endurance, strength, and functional mobility impairments which indicate the need for skilled intervention. Tolerating session today without incident. Will continue to follow and progress as tolerated.

## 2024-03-29 NOTE — THERAPY TREATMENT NOTE
"Subjective: Pt agreeable to therapeutic plan of care.  Cognition: oriented to Person and Place    Objective:     Bed Mobility: Mod-A   Functional Transfers: Min-A, Mod-A, and Assist x 2 sit to stand, stand pivot to BSC     Balance: supported, static, dynamic, and standing Min-A, Assist x 2, and with rolling walker  Functional Ambulation: Min-A, Assist x 2, and with rolling walker    Toileting: Max-A  ADL Position: supported sitting and supported standing  ADL Comments: Patient assisted to [facility] where she voided urine and stool, requiring Max A for hygiene in standing with balance support.    Lower Body Dressing: Mod-A  ADL Position: supported sitting  ADL Comments: Pt able to don one sock, but requires assistance with the other and assistance with managing clean brief over legs prior to managing over hips.      Vitals: WNL    Pain: 0 VAS  Location: na  Interventions for pain: N/A  Education: Provided education on the importance of mobility in the acute care setting, Verbal/Tactile Cues, ADL training, and Transfer Training      Assessment: Stephanie Sol presents with ADL impairments affecting function including balance, cognition, coordination, postural / trunk control, and strength. Demonstrated functioning below baseline abilities indicate the need for continued skilled intervention while inpatient. Pt continues to lose balance posteriorly, but responds well to verbal and tactile cues for correction. Assisted to [facility] and was able to ambulate around bed this date with assist x2. PT making good progress but remains well below her baseline. Tolerating session today without incident. Will continue to follow and progress as tolerated.     Plan/Recommendations:   Moderate Intensity Therapy recommended post-acute care. This is recommended as therapy feels the patient would require 3-4 days per week and wouldn't tolerate \"3 hour daily\" rehab intensity. SNF would be the preferred choice. If the patient does not agree to " SNF, arrange HH or OP depending on home bound status. If patient is medically complex, consider LTACH.. Pt requires no DME at discharge.     Pt desires Skilled Rehab placement at discharge. Pt cooperative; agreeable to therapeutic recommendations and plan of care.     Modified [place]: N/A = No pre-op stroke/TIA    Post-Tx Position: Up in Chair, Staff Present, and Call light and personal items within reach  PPE: gloves

## 2024-03-29 NOTE — CONSULTS
Nephrology  follow up note     Subjective     History of Present Illness:  Patient is a 83 y.o. female  with a previous medical history of HTN, SSS - S/P pacemaker, Depression and GERD who presented to [facility] on 3/26/2024 due to altered mental status and weakness.Patient report  not feeling herself in the last few days.  She said she been taking Ibuprofen for long time that was started by her PCP and she is not sure why. She also started developing rash all over her body for the last 3 days.  She denies any other complaints. Labs shows  Sodium is 135, Potassium 5.6, Creatinine 2.1 (baseline 1.13). Otherwise, labs are unremarkable.  UA shows trace protein.  She is afebrile, all vitals are stable.        Interval Hx:   No acute events overnight   Cr continue to improve to 1.07          History reviewed. No pertinent family history.  Social History     Socioeconomic History    Marital status:    Tobacco Use    Smoking status: Never    Smokeless tobacco: Never   Vaping Use    Vaping status: Never Used   Substance and Sexual Activity    Alcohol use: Never    Drug use: Never    Sexual activity: Defer     Past Medical History:   Diagnosis Date    Essential hypertension 11/26/2022    Gout, unspecified 3/18/2022    Major depressive disorder, single episode, unspecified 3/18/2022    Presence of cardiac pacemaker 3/18/2022    Sick sinus syndrome 3/18/2022     Past Surgical History:   Procedure Laterality Date    CARDIAC ELECTROPHYSIOLOGY PROCEDURE N/A 11/30/2022    Procedure: Biv pacemaker upgradeBiotronik;  Surgeon: Karin Pedersen MD;  Location: [facility] INVASIVE LOCATION;  Service: Cardiovascular;  Laterality: N/A;     Medications Prior to Admission   Medication Sig Dispense Refill Last Dose    allopurinol (ZYLOPRIM) 100 MG tablet Take 1 tablet by mouth Daily.       escitalopram (LEXAPRO) 10 MG tablet Take 1 tablet by mouth Daily.       lisinopril-hydrochlorothiazide (PRINZIDE,ZESTORETIC) 20-25  "MG per tablet Take 1 tablet by mouth Daily.        Patient has no known allergies.    Objective     Review of Systems\"   10 ROS obtained and negative except what mentioned on HPI         Physical Exam:    Constitutional:       Appearance: Normal appearance.   HENT:      Mouth/Throat:      Mouth: Mucous membranes are moist.   Cardiovascular:      Rate and Rhythm: Normal rate and regular rhythm.   Pulmonary:      Effort: Pulmonary effort is normal.      Breath sounds: Normal breath sounds.   Abdominal:      General: Bowel sounds are normal.      Palpations: Abdomen is soft.   Musculoskeletal:         General: Normal range of motion   Skin: Rash noted over her body      Assessment & Plan       # CRISTIAN - likely AIN from Ibuprofen She said she been taking Ibuprofen for long time that was started by her PCP and she is not sure why. She also started developing rash all over her body for the last 3 days   (baseline 1.13).  Back to baseline    # Hyperkalemia in the setting of CRISTIAN   # AMS     Plan:   Hold ibuprofen    Agree with IVF. Continue with a rate of 100 cc/h    kidney US  noted   Pending  UPCR   Solumedrol x1 ordered   Discussed biopsy with the patient   Pending urine Na and Cr  and evaluate FeNa   K normalized following lokelma   Strict I/O         I discussed with the patient/legal representative the risks and the benefits associated with the proposed procedure(s).  The patient/legal representative has been given the opportunity to ask questions.  Alternatives to the proposed treatment (s) were discussed, including the likely results of no treatment.  The patient/legal representative wishes to proceed.       Bridgette Jordan MD  03/29/24  16:11 EDT        "

## 2024-03-29 NOTE — PLAN OF CARE
Problem: Adult Inpatient Plan of Care  Goal: Plan of Care Review  Outcome: Ongoing, Progressing  Goal: Patient-Specific Goal (Individualized)  Outcome: Ongoing, Progressing  Goal: Absence of Hospital-Acquired Illness or Injury  Outcome: Ongoing, Progressing  Intervention: Identify and Manage Fall Risk  Recent Flowsheet Documentation  Taken 3/29/2024 0600 by Sandy Romero RN  Safety Promotion/Fall Prevention:   activity supervised   assistive device/personal items within reach   clutter free environment maintained   fall prevention program maintained   safety round/check completed   room organization consistent  Taken 3/29/2024 0400 by Sandy Romero RN  Safety Promotion/Fall Prevention:   activity supervised   assistive device/personal items within reach   clutter free environment maintained   fall prevention program maintained   safety round/check completed   room organization consistent  Taken 3/29/2024 0300 by Sandy Romero RN  Safety Promotion/Fall Prevention:   activity supervised   assistive device/personal items within reach   clutter free environment maintained   fall prevention program maintained   safety round/check completed   room organization consistent  Taken 3/29/2024 0200 by Sandy Romero RN  Safety Promotion/Fall Prevention:   activity supervised   assistive device/personal items within reach   clutter free environment maintained   fall prevention program maintained   safety round/check completed   room organization consistent  Taken 3/29/2024 0000 by Sandy Romero RN  Safety Promotion/Fall Prevention:   activity supervised   assistive device/personal items within reach   clutter free environment maintained   fall prevention program maintained   safety round/check completed   room organization consistent  Taken 3/28/2024 2300 by Sandy Romero RN  Safety Promotion/Fall Prevention:   activity supervised   assistive device/personal items within reach   clutter free  environment maintained   fall prevention program maintained   safety round/check completed   room organization consistent  Taken 3/28/2024 2200 by Sandy Romero RN  Safety Promotion/Fall Prevention:   activity supervised   assistive device/personal items within reach   clutter free environment maintained   fall prevention program maintained   safety round/check completed   room organization consistent  Taken 3/28/2024 2100 by Sandy Romero RN  Safety Promotion/Fall Prevention:   activity supervised   assistive device/personal items within reach   clutter free environment maintained   fall prevention program maintained   safety round/check completed   room organization consistent  Taken 3/28/2024 1916 by Sandy Romero RN  Safety Promotion/Fall Prevention:   activity supervised   assistive device/personal items within reach   clutter free environment maintained   fall prevention program maintained   safety round/check completed   room organization consistent  Intervention: Prevent Skin Injury  Recent Flowsheet Documentation  Taken 3/29/2024 0400 by Sandy Romero RN  Body Position: position changed independently  Skin Protection:   adhesive use limited   tubing/devices free from skin contact   transparent dressing maintained  Taken 3/29/2024 0000 by Sandy Romero RN  Body Position: position changed independently  Skin Protection:   adhesive use limited   tubing/devices free from skin contact   transparent dressing maintained  Taken 3/28/2024 1916 by Sandy Romero RN  Body Position: position changed independently  Skin Protection:   adhesive use limited   incontinence pads utilized   tubing/devices free from skin contact   transparent dressing maintained  Intervention: Prevent and Manage VTE (Venous Thromboembolism) Risk  Recent Flowsheet Documentation  Taken 3/29/2024 0400 by Sandy Romero RN  VTE Prevention/Management:   patient refused intervention   sequential compression devices  off  Range of Motion: active ROM (range of motion) encouraged  Taken 3/29/2024 0000 by Sandy Romero RN  VTE Prevention/Management:   sequential compression devices off   patient refused intervention  Range of Motion: active ROM (range of motion) encouraged  Taken 3/28/2024 1916 by Sandy Romero RN  VTE Prevention/Management:   patient refused intervention   sequential compression devices off  Range of Motion: active ROM (range of motion) encouraged  Intervention: Prevent Infection  Recent Flowsheet Documentation  Taken 3/29/2024 0600 by Sandy Romero RN  Infection Prevention:   environmental surveillance performed   equipment surfaces disinfected   hand hygiene promoted   personal protective equipment utilized   rest/sleep promoted   single patient room provided  Taken 3/29/2024 0400 by Sandy Romero RN  Infection Prevention:   environmental surveillance performed   equipment surfaces disinfected   hand hygiene promoted   personal protective equipment utilized   rest/sleep promoted   single patient room provided  Taken 3/29/2024 0300 by Sandy Romero RN  Infection Prevention:   environmental surveillance performed   single patient room provided   rest/sleep promoted   personal protective equipment utilized   hand hygiene promoted   equipment surfaces disinfected  Taken 3/29/2024 0200 by Snady Romero RN  Infection Prevention:   equipment surfaces disinfected   environmental surveillance performed   hand hygiene promoted   personal protective equipment utilized   rest/sleep promoted   single patient room provided  Taken 3/29/2024 0000 by Sandy Romero RN  Infection Prevention:   environmental surveillance performed   equipment surfaces disinfected   hand hygiene promoted   personal protective equipment utilized   rest/sleep promoted   single patient room provided  Taken 3/28/2024 2300 by Sandy Romero RN  Infection Prevention:   environmental surveillance performed   equipment  surfaces disinfected   hand hygiene promoted   personal protective equipment utilized   single patient room provided   rest/sleep promoted  Taken 3/28/2024 2200 by Sandy Romero RN  Infection Prevention:   single patient room provided   rest/sleep promoted   personal protective equipment utilized   hand hygiene promoted   equipment surfaces disinfected   environmental surveillance performed  Taken 3/28/2024 2100 by Sandy Romero RN  Infection Prevention:   environmental surveillance performed   equipment surfaces disinfected   hand hygiene promoted   personal protective equipment utilized   rest/sleep promoted   single patient room provided  Taken 3/28/2024 1916 by Sandy Romero RN  Infection Prevention:   environmental surveillance performed   single patient room provided   personal protective equipment utilized   hand hygiene promoted   equipment surfaces disinfected  Goal: Optimal Comfort and Wellbeing  Outcome: Ongoing, Progressing  Intervention: Provide Person-Centered Care  Recent Flowsheet Documentation  Taken 3/28/2024 1916 by Sandy Romero RN  Trust Relationship/Rapport:   care explained   choices provided   questions answered   thoughts/feelings acknowledged   reassurance provided  Goal: Readiness for Transition of Care  Outcome: Ongoing, Progressing     Problem: Confusion Chronic  Goal: Optimal Cognitive Function  Outcome: Ongoing, Progressing  Intervention: Minimize and Manage Confusion  Recent Flowsheet Documentation  Taken 3/28/2024 1916 by Sandy Romero RN  Sensory Stimulation Regulation: television on  Reorientation Measures:   clock in view   reorientation provided  Environmental Support: calm environment promoted  Family/Support System Care:   support provided   self-care encouraged  Intervention: Minimize Injury Risk and Provide Safety  Recent Flowsheet Documentation  Taken 3/29/2024 0600 by Sandy Romero RN  Enhanced Safety Measures: (Med Sitter)   bed alarm set    other (see comments)  Taken 3/29/2024 0400 by Sandy Romero RN  Enhanced Safety Measures: (med sitter)   bed alarm set   other (see comments)  Taken 3/29/2024 0300 by Sandy Romero RN  Enhanced Safety Measures: (med sitter)   bed alarm set   other (see comments)  Taken 3/29/2024 0200 by Sandy Romero RN  Enhanced Safety Measures: (med sitter)   bed alarm set   other (see comments)  Taken 3/29/2024 0000 by Sandy Romero RN  Enhanced Safety Measures: (med sitter)   bed alarm set   other (see comments)  Taken 3/28/2024 2300 by Sandy Romero RN  Enhanced Safety Measures: (med sitter)   bed alarm set   other (see comments)  Taken 3/28/2024 2200 by Sandy Romero RN  Enhanced Safety Measures: (Med Sitter)   bed alarm set   other (see comments)  Taken 3/28/2024 2100 by Sandy Romero RN  Enhanced Safety Measures: (Med Sitter)   bed alarm set   other (see comments)  Taken 3/28/2024 1916 by Sandy Romero RN  Enhanced Safety Measures: (MedvSitter)   bed alarm set   other (see comments)     Problem: Skin Injury Risk Increased  Goal: Skin Health and Integrity  Outcome: Ongoing, Progressing  Intervention: Optimize Skin Protection  Recent Flowsheet Documentation  Taken 3/29/2024 0400 by Sandy Romero RN  Pressure Reduction Techniques: frequent weight shift encouraged  Head of Bed (HOB) Positioning: HOB elevated  Pressure Reduction Devices: pressure-redistributing mattress utilized  Skin Protection:   adhesive use limited   tubing/devices free from skin contact   transparent dressing maintained  Taken 3/29/2024 0000 by Sandy Romero RN  Pressure Reduction Techniques: frequent weight shift encouraged  Head of Bed (HOB) Positioning: HOB elevated  Pressure Reduction Devices: pressure-redistributing mattress utilized  Skin Protection:   adhesive use limited   tubing/devices free from skin contact   transparent dressing maintained  Taken 3/28/2024 1916 by Sandy Romero RN  Pressure  Reduction Techniques: frequent weight shift encouraged  Head of Bed (HOB) Positioning: HOB elevated  Pressure Reduction Devices: pressure-redistributing mattress utilized  Skin Protection:   adhesive use limited   incontinence pads utilized   tubing/devices free from skin contact   transparent dressing maintained     Problem: Fall Injury Risk  Goal: Absence of Fall and Fall-Related Injury  Outcome: Ongoing, Progressing  Intervention: Identify and Manage Contributors  Recent Flowsheet Documentation  Taken 3/28/2024 1916 by Sandy Romero RN  Medication Review/Management: medications reviewed  Intervention: Promote Injury-Free Environment  Recent Flowsheet Documentation  Taken 3/29/2024 0600 by Sandy Romero RN  Safety Promotion/Fall Prevention:   activity supervised   assistive device/personal items within reach   clutter free environment maintained   fall prevention program maintained   safety round/check completed   room organization consistent  Taken 3/29/2024 0400 by Sandy Romero RN  Safety Promotion/Fall Prevention:   activity supervised   assistive device/personal items within reach   clutter free environment maintained   fall prevention program maintained   safety round/check completed   room organization consistent  Taken 3/29/2024 0300 by Sandy Romero RN  Safety Promotion/Fall Prevention:   activity supervised   assistive device/personal items within reach   clutter free environment maintained   fall prevention program maintained   safety round/check completed   room organization consistent  Taken 3/29/2024 0200 by Sandy Romero RN  Safety Promotion/Fall Prevention:   activity supervised   assistive device/personal items within reach   clutter free environment maintained   fall prevention program maintained   safety round/check completed   room organization consistent  Taken 3/29/2024 0000 by Sandy Romero RN  Safety Promotion/Fall Prevention:   activity supervised   assistive  device/personal items within reach   clutter free environment maintained   fall prevention program maintained   safety round/check completed   room organization consistent  Taken 3/28/2024 2300 by Sandy Romero RN  Safety Promotion/Fall Prevention:   activity supervised   assistive device/personal items within reach   clutter free environment maintained   fall prevention program maintained   safety round/check completed   room organization consistent  Taken 3/28/2024 2200 by Sandy Romero RN  Safety Promotion/Fall Prevention:   activity supervised   assistive device/personal items within reach   clutter free environment maintained   fall prevention program maintained   safety round/check completed   room organization consistent  Taken 3/28/2024 2100 by Sandy Romero RN  Safety Promotion/Fall Prevention:   activity supervised   assistive device/personal items within reach   clutter free environment maintained   fall prevention program maintained   safety round/check completed   room organization consistent  Taken 3/28/2024 1916 by Sandy Romero RN  Safety Promotion/Fall Prevention:   activity supervised   assistive device/personal items within reach   clutter free environment maintained   fall prevention program maintained   safety round/check completed   room organization consistent   Goal Outcome Evaluation:

## 2024-03-29 NOTE — PROGRESS NOTES
[facility] Medicine Services       Patient Name: Stephanie Sol  : 1940  MRN: [id number]  Primary Care Physician:  Lior Oscar MD  Date of admission: 3/26/2024  Date and Time of Service: 3/26/2024 at 2145        Assessment & Plan       Chief Complaint: altered mental status, weakness     Plan:     Home medications not verified at the time of assessment and plan     CRSITIAN  -Creatinine 2.1 (baseline 1.1 ), monitor  -IV fluids ordered  -Avoid nephrotoxic medication and contrast  -Avoid hypotension     Generalized Weakness  -PT/OT consult  -Fall precautions  -Case management consult for discharge planning     Essential Hypertension  -Controlled  -Monitor BP  -Continue home lisinopril, hydrochlorothiazide     Depression  -Continue Lexapro     GERD  -PPI     Rash  -Patient believes this is from the antibiotics she has been on for an abdominal infection  -Solumedrol x1 ordered  -Monitor for improvement     History of Present Illness            History of Present Illness: Stephanie Sol is a 83 y.o. female with a previous medical history of HTN, SSS, S/P pacemaker, Depression and GERD who presented to [facility] on 3/26/2024 due to altered mental status and weakness. She denies any other complaints.      In the ED, Sodium is 135, Potassium 5.6, Creatinine 2.1 (baseline 1.13). Otherwise, labs are unremarkable.  UA shows trace protein.  She is afebrile, all vitals are stable.  Hospitalist was consulted for further management.     12 point ROS reviewed and negative except as mentioned above      3/27  Patient is pleasantly confused  Feeling weak  Patient  report that patient has been confused as a baseline but and her balance has been extremely compared over last 3 days  Will get an MRI of the brain for further evaluation to evaluate posterior fossa for any cerebellar stroke that could have caused the balance issue  Her creatinine is down to 1.74 from 2.14 and is awaiting nephrology  evaluation  Continue hydration and follow daily creatinine  I tried to order MRI patient had some metallic implant so could not have an MRI    Patient had CT of the brain yesterday with no acute abnormalities  Will get neuroconsult for further recommendation    3/28  Patient seen with RN at bedside  She is pleasantly confused sitting in her chair comfortable  Creatinine is at 1.74 with baseline of 1.1 seen per nephrology  ctof the brain is negative for acute abnormalities with chronic microvascular changes  Patient had MRI of the brain in 2022 showing atrophy  Her  saying that her memory was fine up till recently  I think patient does have underlying history of dementia she is little more depressed due to dementia  She will probably benefit from skilled nursing facility placement  She will need 30 days or less  Check MRI results  Her TSH was normal  Discharge planning skilled nursing facility once bed is available and if cleared per nephrology    3/29  MRI of the cervical spine with severe spinal stenosis and foraminal narrowing  Patient was some neck pain  Will get neurosurgical consult for evaluation  Continue to be pleasantly confused  MRI of the brain otherwise with no acute abnormalities except for moderate atrophy and chronic microvascular disease which correlate with patient underlying dementia     Objective       Vitals:   Temp:  [100.5 °F (38.1 °C)] 100.5 °F (38.1 °C)  Heart Rate:  [60-75] 65  Resp:  [16-18] 18  BP: (110-149)/(60-85) 123/64  Body mass index is 24.69 kg/m².  Physical Exam  Vitals and nursing note reviewed.   Constitutional:       Appearance: Normal appearance.   HENT:      Mouth/Throat:      Mouth: Mucous membranes are moist.   Cardiovascular:      Rate and Rhythm: Normal rate and regular rhythm.   Pulmonary:      Effort: Pulmonary effort is normal.      Breath sounds: Normal breath sounds.   Abdominal:      General: Bowel sounds are normal.      Palpations: Abdomen is soft.    Musculoskeletal:         General: Normal range of motion.   Skin:     General: Skin is warm and dry.      Findings: Rash (generalized, over her entire body) present.   Neurological:      General: No focal deficit present.      Mental Status: She is alert and oriented to person, place, and time. Mental status is at baseline.   Psychiatric:         Mood and Affect: Mood normal.         Behavior: Behavior normal.               Personal History      This is a 83 y.o. female with:     Medical History        Past Medical History:   Diagnosis Date    Essential hypertension 11/26/2022    Gout, unspecified 3/18/2022    Major depressive disorder, single episode, unspecified 3/18/2022    Presence of cardiac pacemaker 3/18/2022    Sick sinus syndrome 3/18/2022            Surgical History         Past Surgical History:   Procedure Laterality Date    CARDIAC ELECTROPHYSIOLOGY PROCEDURE N/A 11/30/2022     Procedure: Biv pacemaker upgradeBiotronik;  Surgeon: Karin Pedersen MD;  Location: [facility] CATH INVASIVE LOCATION;  Service: Cardiovascular;  Laterality: N/A;            Active and Resolved Problems  There are no hospital problems to display for this patient.        Family History: family history is not on file. Otherwise pertinent FHx was reviewed and not pertinent to current issue.     Social History:  reports that she has never smoked. She has never used smokeless tobacco. She reports that she does not drink alcohol and does not use drugs.     Home Medications:  Prior to Admission Medications         None                   Allergies:  Allergies   No Known Allergies              DVT prophylaxis:  Mechanical DVT prophylaxis orders are present.           CODE STATUS:       Code Status (Patient has no pulse and is not breathing): CPR (Attempt to Resuscitate)  Medical Interventions (Patient has pulse or is breathing): Full Support           Admission Status:  I believe this patient meets observation status.     I discussed the  patient's findings and my recommendations with patient.     Signature:

## 2024-03-30 LAB
ALBUMIN SERPL-MCNC: 4.1 G/DL (ref 3.5–5.2)
ALBUMIN/GLOB SERPL: 1.3 G/DL
ALP SERPL-CCNC: 122 U/L (ref 39–117)
ALT SERPL W P-5'-P-CCNC: 22 U/L (ref 1–33)
ANION GAP SERPL CALCULATED.3IONS-SCNC: 13 MMOL/L (ref 5–15)
AST SERPL-CCNC: 32 U/L (ref 1–32)
BILIRUB SERPL-MCNC: 0.3 MG/DL (ref 0–1.2)
BUN SERPL-MCNC: 24 MG/DL (ref 8–23)
BUN/CREAT SERPL: 24.2 (ref 7–25)
CALCIUM SPEC-SCNC: 10.4 MG/DL (ref 8.6–10.5)
CHLORIDE SERPL-SCNC: 104 MMOL/L (ref 98–107)
CO2 SERPL-SCNC: 22 MMOL/L (ref 22–29)
CREAT SERPL-MCNC: 0.99 MG/DL (ref 0.57–1)
CREAT UR-MCNC: 68.1 MG/DL
CREAT UR-MCNC: 70.1 MG/DL
EGFRCR SERPLBLD CKD-EPI 2021: 57.4 ML/MIN/1.73
GLOBULIN UR ELPH-MCNC: 3.1 GM/DL
GLUCOSE SERPL-MCNC: 102 MG/DL (ref 65–99)
POTASSIUM SERPL-SCNC: 4.4 MMOL/L (ref 3.5–5.2)
PROT ?TM UR-MCNC: 8.2 MG/DL
PROT SERPL-MCNC: 7.2 G/DL (ref 6–8.5)
PROT/CREAT UR: 117 MG/G CREA (ref 0–200)
SODIUM SERPL-SCNC: 139 MMOL/L (ref 136–145)

## 2024-03-30 PROCEDURE — 25010000002 ONDANSETRON PER 1 MG

## 2024-03-30 PROCEDURE — 25810000003 SODIUM CHLORIDE 0.9 % SOLUTION

## 2024-03-30 PROCEDURE — 80053 COMPREHEN METABOLIC PANEL: CPT | Performed by: INTERNAL MEDICINE

## 2024-03-30 RX ADMIN — Medication 10 ML: at 20:06

## 2024-03-30 RX ADMIN — ASPIRIN 325 MG ORAL TABLET 325 MG: 325 PILL ORAL at 08:54

## 2024-03-30 RX ADMIN — SODIUM CHLORIDE 100 ML/HR: 9 INJECTION, SOLUTION INTRAVENOUS at 08:54

## 2024-03-30 RX ADMIN — ALLOPURINOL 100 MG: 100 TABLET ORAL at 08:54

## 2024-03-30 RX ADMIN — ONDANSETRON 4 MG: 2 INJECTION INTRAMUSCULAR; INTRAVENOUS at 20:10

## 2024-03-30 RX ADMIN — ATORVASTATIN CALCIUM 40 MG: 40 TABLET, FILM COATED ORAL at 20:05

## 2024-03-30 RX ADMIN — Medication 5 MG: at 20:06

## 2024-03-30 RX ADMIN — ESCITALOPRAM OXALATE 10 MG: 10 TABLET ORAL at 08:54

## 2024-03-30 RX ADMIN — Medication 10 ML: at 08:55

## 2024-03-30 NOTE — CONSULTS
Neurosurgery Consultation      Patient: Stephanie Sol    Sex: female    YOB: 1940    Date of Admission: 3/26/2024  6:35 PM    Admitting Dx: Altered mental status [R41.82]  CRISTIAN (acute kidney injury) [N17.9]  Acute kidney injury [N17.9]  Altered mental status, unspecified altered mental status type [R41.82]    Reason for Consult: Abnormal MRI cervical spine      Subjective     CC: Neck pain    HPI:  Patient is a 83 y.o. female with history of hypertension, sick sinus syndrome, and implanted pacemaker who presented to the hospital with complaints of altered mental status.  Due to complaints of neck pain and abnormal MRI neurosurgery was consulted.    During my evaluation patient reports off-and-on neck pain chronically.  Currently she feels this pain is well-managed.  She denies symptoms radiating down either upper extremity.  She does have chronic balance issues and recurrent falls.  She denies weakness in her extremities, but does feel a general sense of weakness throughout her whole body.  She seems to have some generalized baseline confusion.      PMH:  Past Medical History:   Diagnosis Date    Essential hypertension 11/26/2022    Gout, unspecified 3/18/2022    Major depressive disorder, single episode, unspecified 3/18/2022    Presence of cardiac pacemaker 3/18/2022    Sick sinus syndrome 3/18/2022         Current Facility-Administered Medications:     acetaminophen (TYLENOL) tablet 650 mg, 650 mg, Oral, Q4H PRN **OR** acetaminophen (TYLENOL) 160 MG/5ML oral solution 650 mg, 650 mg, Oral, Q4H PRN **OR** acetaminophen (TYLENOL) suppository 650 mg, 650 mg, Rectal, Q4H PRN, Nahed Muse APRN    allopurinol (ZYLOPRIM) tablet 100 mg, 100 mg, Oral, Daily, Nahed Muse APRN, 100 mg at 03/29/24 0849    aspirin tablet 325 mg, 325 mg, Oral, Daily, Fredi Buchanan MD, 325 mg at 03/29/24 0849    atorvastatin (LIPITOR) tablet 40 mg, 40 mg, Oral, Nightly, Fredi Buchanan MD, 40 mg at 03/28/24  2030    sennosides-docusate (PERICOLACE) 8.6-50 MG per tablet 2 tablet, 2 tablet, Oral, BID PRN **AND** polyethylene glycol (MIRALAX) packet 17 g, 17 g, Oral, Daily PRN **AND** bisacodyl (DULCOLAX) EC tablet 5 mg, 5 mg, Oral, Daily PRN **AND** bisacodyl (DULCOLAX) suppository 10 mg, 10 mg, Rectal, Daily PRN, Nahed Muse L, APRN    escitalopram (LEXAPRO) tablet 10 mg, 10 mg, Oral, Daily, Yahyawi, Nahed L, APRN, 10 mg at 03/29/24 0849    lisinopril (PRINIVIL,ZESTRIL) tablet 20 mg, 20 mg, Oral, Q24H, 20 mg at 03/29/24 0855 **AND** hydroCHLOROthiazide tablet 25 mg, 25 mg, Oral, Q24H, Nahed Muse L, APRN, 25 mg at 03/29/24 0855    melatonin tablet 5 mg, 5 mg, Oral, Nightly PRN, Nahed Muse, APRN, 5 mg at 03/28/24 2029    ondansetron (ZOFRAN) injection 4 mg, 4 mg, Intravenous, Q6H PRN, Nahed Muse, APRN    [COMPLETED] Insert Peripheral IV, , , Once **AND** sodium chloride 0.9 % flush 10 mL, 10 mL, Intravenous, PRN, Nahed Muse L, APRN    sodium chloride 0.9 % flush 10 mL, 10 mL, Intravenous, Q12H, Nahed Muse, APRN, 10 mL at 03/29/24 0857    sodium chloride 0.9 % flush 10 mL, 10 mL, Intravenous, PRN, Nahed Muse L, APRN    sodium chloride 0.9 % infusion 40 mL, 40 mL, Intravenous, PRN, Nahed Muse L, APRN    sodium chloride 0.9 % infusion, 100 mL/hr, Intravenous, Continuous, Nahed Muse, APRN, Last Rate: 100 mL/hr at 03/29/24 0849, 100 mL/hr at 03/29/24 0849    sodium zirconium cyclosilicate (LOKELMA) packet 5 g, 5 g, Oral, Once, Bridgette Jordan MD    No Known Allergies    Past Surgical History:   Procedure Laterality Date    CARDIAC ELECTROPHYSIOLOGY PROCEDURE N/A 11/30/2022    Procedure: Biv pacemaker upgradeBiotronik;  Surgeon: Karin Pedersen MD;  Location: [facility] INVASIVE LOCATION;  Service: Cardiovascular;  Laterality: N/A;       Social History     Socioeconomic History    Marital status:    Tobacco Use    Smoking status: Never    Smokeless tobacco: Never   Vaping Use     Vaping status: Never Used   Substance and Sexual Activity    Alcohol use: Never    Drug use: Never    Sexual activity: Defer       History reviewed. No pertinent family history.      Current Medications:  Scheduled Meds:allopurinol, 100 mg, Oral, Daily  aspirin, 325 mg, Oral, Daily  atorvastatin, 40 mg, Oral, Nightly  escitalopram, 10 mg, Oral, Daily  lisinopril, 20 mg, Oral, Q24H   And  hydroCHLOROthiazide, 25 mg, Oral, Q24H  sodium chloride, 10 mL, Intravenous, Q12H  sodium zirconium cyclosilicate, 5 g, Oral, Once      Continuous Infusions:sodium chloride, 100 mL/hr, Last Rate: 100 mL/hr (03/29/24 0849)      PRN Meds:   acetaminophen **OR** acetaminophen **OR** acetaminophen    senna-docusate sodium **AND** polyethylene glycol **AND** bisacodyl **AND** bisacodyl    melatonin    ondansetron    [COMPLETED] Insert Peripheral IV **AND** sodium chloride    sodium chloride    sodium chloride    ROS: 14 point review of systems was completed and is negative except for what is noted in HPI      Objective     Vitals:    03/29/24 0854 03/29/24 1140 03/29/24 1555 03/29/24 1958   BP: 142/43 106/60 104/55 120/64   BP Location: Left arm Left arm Right arm Right arm   Patient Position: Lying Lying Lying Lying   Pulse: 60  60 70   Resp: 15 17 17 13   Temp: 97.9 °F (36.6 °C)  97.6 °F (36.4 °C) 97.4 °F (36.3 °C)   TempSrc: Axillary  Tympanic Oral   SpO2: 96% 95% 99% 99%   Weight:       Height:           Physical exam  General  - awake, cooperative, in no acute distress  HEENT  - Normocephalic, atraumatic  - PERRLA, EOM intact  Cardiac  - RRR, no peripheral edema  Respiratory  - Normal respiratory rate and effort      NEUROLOGIC    MOTOR:  - PORTILLO symmetrically; no focal motor deficit  SENSORY:  - Normal to touch and pinprick, axial and peripheral            RESULTS REVIEW:  Results from last 7 days   Lab Units 03/29/24  0402 03/28/24  1339 03/27/24  0648   WBC 10*3/mm3 9.47 9.63 7.85   HEMOGLOBIN g/dL 13.4 14.4 13.1   HEMATOCRIT %  41.9 45.5 42.1   PLATELETS 10*3/mm3 148 168 165        Results from last 7 days   Lab Units 03/29/24  0940 03/28/24  1339 03/27/24  0648 03/26/24  1906   SODIUM mmol/L 132* 136 135* 135*   POTASSIUM mmol/L 4.7 4.6 5.5* 5.6*   CHLORIDE mmol/L 104 100 101 102   CO2 mmol/L 16.0* 23.0 20.0* 20.0*   BUN mg/dL 27* 30* 39* 36*   CREATININE mg/dL 1.07* 1.30* 1.74* 2.10*   CALCIUM mg/dL 9.0 10.0 9.5 9.7   BILIRUBIN mg/dL  --   --   --  0.2   ALK PHOS U/L  --   --   --  106   ALT (SGPT) U/L  --   --   --  20   AST (SGOT) U/L  --   --   --  29   GLUCOSE mg/dL 77 90 158* 133*              MRI Cervical Spine Without Contrast    Result Date: 3/28/2024  MRI CERVICAL SPINE WO CONTRAST Date of Exam: 3/28/2024 4:35 PM EDT Indication: Left-sided weakness.  Comparison: None available. Technique:  Routine multiplanar/multisequence sequence images of the cervical spine were obtained without contrast administration.  Findings: The study is significantly degraded by patient motion. The patient was unable to follow commands and hold still. The technologist states that best attempts were made. Normal cervical vertebral body alignment without gross fracture or subluxation. There is no high-grade cervical canal stenosis identified. At C2-3, there is moderate left facet arthropathy and suspected moderate left neural foraminal stenosis. Right neural foramen is patent. No significant disc bulge. At C3-4, severe left facet arthropathy is present. There is a moderate disc bulge just indenting the thecal sac, with borderline to mild canal stenosis. Severe left neural foraminal stenosis. Right neural foramen is patent. At C4-5, mild bilateral uncovertebral spurring is suggested without significant disc bulge. No significant canal stenosis. Moderately advanced left facet arthropathy. Suspected moderate to severe left neural foraminal stenosis. Right neural foramen appears patent. At C5-6 mild concentric disc bulge is present with left side  uncovertebral spurring. Mild central canal stenosis and left lateral recess stenosis. Severe left facet arthropathy with severe left neural foraminal stenosis. Mild right neural foraminal stenosis. At C6-7, no significant disc bulge. Moderate left facet arthropathy. No significant central canal stenosis. Right-sided uncovertebral spurring. Mild to moderate bilateral neural foraminal stenosis At C7-T1, no significant disc bulge, canal or foraminal stenosis is seen. The cervical spinal cord appears to be of normal caliber and signal intensity.     Impression: 1. Limited examination, degraded by patient motion. 2. Multilevel moderate to severe left neural foraminal stenosis. No significant right neural foraminal stenosis is seen. 3. No high-grade cervical canal stenosis is identified. Electronically Signed: Chlea Sams MD  3/28/2024 5:07 PM EDT  Workstation ID: [id number]         Assessment     MDM: This is a 83 y.o. female being eval by neurosurgery for complaints of neck pain.  She has chronic neck pain which has bothered her a fair amount recently, however is relatively well-controlled today.  She denies symptoms radiating down either upper extremity.  She does have some chronic subjective myelopathic complaints but is not floridly myelopathic on exam.  MRI cervical spine shows severe multilevel neuroforaminal stenosis on the left side.  There is no high-grade canal stenosis, cord compression, or cord signal change.        Plan     Degenerative disc disease, cervical spine  Cervical spondylosis without myelopathy    No neurosurgical intervention indicated at this time.  Would recommend continued management of her neck pain as it seems to be working well at present.  If she continues to have neck pain after discharge or develops left-sided radicular symptoms she may present for follow-up.  Otherwise recommend she follow-up with her PCP and/or pain management after discharge.  Patient is agreeable to this  plan.    Neurosurgery will sign off at this time.  Call with any questions or concerns.      I discussed my assessment and recommendations with Dr. Marietta Grimm PA-C  3/29/2024  20:08 EDT      Part of this note may be an electronic transcription/translation of spoken language to printed text using the Dragon Dictation System.

## 2024-03-30 NOTE — CASE MANAGEMENT/SOCIAL WORK
Continued Stay Note   Addy     Patient Name: Stephanie Sol  MRN: [id number]  Today's Date: 3/30/2024    Admit Date: 3/26/2024    Plan: [facility] accepted.  Bed ready 3/31.  Precert not required.  PASRR approved.   Discharge Plan       Row Name 03/30/24 1341       Plan    Plan [facility] accepted.  Bed ready 3/31.  Precert not required.  PASRR approved.    Plan Comments CM verified that bed is ready at [place].  Nursing stated that pt had a sitter and verified understanding that pt must be sitter free x 24 hours prior to discharge to facility.                      Expected Discharge Date and Time       Expected Discharge Date Expected Discharge Time    Mar 31, 2024               Alba Richter RN

## 2024-03-30 NOTE — PLAN OF CARE
Goal Outcome Evaluation:                 Problem: Adult Inpatient Plan of Care  Goal: Plan of Care Review  Outcome: Ongoing, Progressing  Goal: Patient-Specific Goal (Individualized)  Outcome: Ongoing, Progressing  Goal: Absence of Hospital-Acquired Illness or Injury  Outcome: Ongoing, Progressing  Intervention: Identify and Manage Fall Risk  Recent Flowsheet Documentation  Taken 3/30/2024 1454 by Leigh Ann Oliver RN  Safety Promotion/Fall Prevention:   activity supervised   assistive device/personal items within reach   clutter free environment maintained   fall prevention program maintained   nonskid shoes/slippers when out of bed   room organization consistent   safety round/check completed  Taken 3/30/2024 1400 by Leigh Ann Oliver RN  Safety Promotion/Fall Prevention:   activity supervised   assistive device/personal items within reach   clutter free environment maintained   fall prevention program maintained   nonskid shoes/slippers when out of bed   room organization consistent   safety round/check completed  Taken 3/30/2024 1300 by Leigh Ann Oliver RN  Safety Promotion/Fall Prevention:   activity supervised   assistive device/personal items within reach   clutter free environment maintained   fall prevention program maintained   nonskid shoes/slippers when out of bed   room organization consistent   safety round/check completed  Taken 3/30/2024 1200 by Leigh Ann Oliver RN  Safety Promotion/Fall Prevention:   activity supervised   assistive device/personal items within reach   clutter free environment maintained   fall prevention program maintained   nonskid shoes/slippers when out of bed   room organization consistent   safety round/check completed  Taken 3/30/2024 1000 by Leigh Ann Oliver RN  Safety Promotion/Fall Prevention:   activity supervised   assistive device/personal items within reach   clutter free environment maintained   fall prevention program maintained   nonskid shoes/slippers when out of  bed   room organization consistent   safety round/check completed  Taken 3/30/2024 0800 by Leigh Ann Oliver RN  Safety Promotion/Fall Prevention:   activity supervised   assistive device/personal items within reach   clutter free environment maintained   fall prevention program maintained   nonskid shoes/slippers when out of bed   room organization consistent   safety round/check completed  Intervention: Prevent Skin Injury  Recent Flowsheet Documentation  Taken 3/30/2024 1200 by Leigh Ann Oliver RN  Body Position: position changed independently  Skin Protection:   adhesive use limited   incontinence pads utilized   transparent dressing maintained   tubing/devices free from skin contact  Taken 3/30/2024 0800 by Leigh Ann Oliver RN  Body Position: position changed independently  Skin Protection:   adhesive use limited   incontinence pads utilized   transparent dressing maintained   tubing/devices free from skin contact  Intervention: Prevent and Manage VTE (Venous Thromboembolism) Risk  Recent Flowsheet Documentation  Taken 3/30/2024 1200 by Leigh Ann Oliver RN  Activity Management: up in chair  VTE Prevention/Management:   sequential compression devices off   patient refused intervention  Taken 3/30/2024 0800 by Leigh Ann Oliver RN  Activity Management: dorsiflexion/plantar flexion performed  VTE Prevention/Management:   sequential compression devices off   patient refused intervention  Intervention: Prevent Infection  Recent Flowsheet Documentation  Taken 3/30/2024 1454 by Leigh Ann Oliver RN  Infection Prevention:   environmental surveillance performed   hand hygiene promoted   rest/sleep promoted   single patient room provided  Taken 3/30/2024 1400 by Leigh Ann Oliver RN  Infection Prevention:   environmental surveillance performed   hand hygiene promoted   rest/sleep promoted   single patient room provided  Taken 3/30/2024 1300 by Leigh Ann Oliver RN  Infection Prevention:   environmental surveillance  performed   hand hygiene promoted   rest/sleep promoted   single patient room provided  Taken 3/30/2024 1200 by Leigh Ann Oliver RN  Infection Prevention:   environmental surveillance performed   hand hygiene promoted   rest/sleep promoted   single patient room provided  Taken 3/30/2024 1000 by Leigh Ann Oliver RN  Infection Prevention:   environmental surveillance performed   hand hygiene promoted   rest/sleep promoted   single patient room provided  Taken 3/30/2024 0800 by Leigh Ann Oliver RN  Infection Prevention:   environmental surveillance performed   hand hygiene promoted   rest/sleep promoted   single patient room provided  Goal: Optimal Comfort and Wellbeing  Outcome: Ongoing, Progressing  Intervention: Provide Person-Centered Care  Recent Flowsheet Documentation  Taken 3/30/2024 1200 by Leigh Ann Oliver RN  Trust Relationship/Rapport:   care explained   choices provided   emotional support provided   questions answered   reassurance provided   empathic listening provided   questions encouraged   thoughts/feelings acknowledged  Taken 3/30/2024 0800 by Leigh Ann Oliver RN  Trust Relationship/Rapport:   care explained   choices provided   emotional support provided   empathic listening provided   questions answered   questions encouraged   reassurance provided   thoughts/feelings acknowledged  Goal: Readiness for Transition of Care  Outcome: Ongoing, Progressing     Problem: Confusion Chronic  Goal: Optimal Cognitive Function  Outcome: Ongoing, Progressing  Intervention: Minimize and Manage Confusion  Recent Flowsheet Documentation  Taken 3/30/2024 1200 by Leigh Ann Oliver RN  Sensory Stimulation Regulation: television on  Reorientation Measures:   calendar in view   clock in view  Environmental Support: calm environment promoted  Environment Familiarity/Consistency: daily routine followed  Family/Support System Care:   support provided   self-care encouraged  Self-Care Promotion:   independence  encouraged   BADL personal objects within reach   BADL personal routines maintained  Taken 3/30/2024 0800 by Leigh Ann Oliver RN  Reorientation Measures:   clock in view   calendar in view  Environmental Support: calm environment promoted  Environment Familiarity/Consistency: daily routine followed  Family/Support System Care:   self-care encouraged   support provided  Self-Care Promotion:   independence encouraged   BADL personal objects within reach   BADL personal routines maintained  Intervention: Minimize Injury Risk and Provide Safety  Recent Flowsheet Documentation  Taken 3/30/2024 1454 by Leigh Ann Oliver RN  Enhanced Safety Measures: bed alarm set  Taken 3/30/2024 1400 by Leigh Ann Oliver RN  Enhanced Safety Measures: bed alarm set  Taken 3/30/2024 1300 by Leigh Ann Oliver RN  Enhanced Safety Measures: bed alarm set  Taken 3/30/2024 1200 by Leigh Ann Oliver RN  Enhanced Safety Measures: bed alarm set  Taken 3/30/2024 1000 by Leigh Ann Oliver RN  Enhanced Safety Measures:   bed alarm set    at bedside  Taken 3/30/2024 0800 by Leigh Ann Oliver RN  Enhanced Safety Measures:   bed alarm set    at bedside     Problem: Skin Injury Risk Increased  Goal: Skin Health and Integrity  Outcome: Ongoing, Progressing  Intervention: Optimize Skin Protection  Recent Flowsheet Documentation  Taken 3/30/2024 1200 by Leigh Ann Oliver RN  Pressure Reduction Techniques:   frequent weight shift encouraged   weight shift assistance provided  Head of Bed (HOB) Positioning: HOB elevated  Pressure Reduction Devices: pressure-redistributing mattress utilized  Skin Protection:   adhesive use limited   incontinence pads utilized   transparent dressing maintained   tubing/devices free from skin contact  Taken 3/30/2024 0800 by Leigh Ann Oliver RN  Pressure Reduction Techniques:   frequent weight shift encouraged   weight shift assistance provided  Head of Bed (HOB) Positioning: HOB at 20-30  degrees  Pressure Reduction Devices: pressure-redistributing mattress utilized  Skin Protection:   adhesive use limited   incontinence pads utilized   transparent dressing maintained   tubing/devices free from skin contact     Problem: Fall Injury Risk  Goal: Absence of Fall and Fall-Related Injury  Outcome: Ongoing, Progressing  Intervention: Identify and Manage Contributors  Recent Flowsheet Documentation  Taken 3/30/2024 1454 by Leigh Ann Oliver RN  Medication Review/Management: medications reviewed  Taken 3/30/2024 1400 by Leigh Ann Oliver RN  Medication Review/Management: medications reviewed  Taken 3/30/2024 1300 by Leigh Ann Oliver RN  Medication Review/Management: medications reviewed  Taken 3/30/2024 1200 by Leigh Ann Oliver RN  Medication Review/Management: medications reviewed  Self-Care Promotion:   independence encouraged   BADL personal objects within reach   BADL personal routines maintained  Taken 3/30/2024 1000 by Leigh Ann Oliver RN  Medication Review/Management: medications reviewed  Taken 3/30/2024 0800 by Leigh Ann Oliver RN  Medication Review/Management: medications reviewed  Self-Care Promotion:   independence encouraged   BADL personal objects within reach   BADL personal routines maintained  Intervention: Promote Injury-Free Environment  Recent Flowsheet Documentation  Taken 3/30/2024 1454 by Leigh Ann Oliver RN  Safety Promotion/Fall Prevention:   activity supervised   assistive device/personal items within reach   clutter free environment maintained   fall prevention program maintained   nonskid shoes/slippers when out of bed   room organization consistent   safety round/check completed  Taken 3/30/2024 1400 by Leigh Ann Oliver RN  Safety Promotion/Fall Prevention:   activity supervised   assistive device/personal items within reach   clutter free environment maintained   fall prevention program maintained   nonskid shoes/slippers when out of bed   room organization consistent    safety round/check completed  Taken 3/30/2024 1300 by Leigh Ann Oliver RN  Safety Promotion/Fall Prevention:   activity supervised   assistive device/personal items within reach   clutter free environment maintained   fall prevention program maintained   nonskid shoes/slippers when out of bed   room organization consistent   safety round/check completed  Taken 3/30/2024 1200 by Leigh Ann Oliver RN  Safety Promotion/Fall Prevention:   activity supervised   assistive device/personal items within reach   clutter free environment maintained   fall prevention program maintained   nonskid shoes/slippers when out of bed   room organization consistent   safety round/check completed  Taken 3/30/2024 1000 by Leigh Ann Oliver RN  Safety Promotion/Fall Prevention:   activity supervised   assistive device/personal items within reach   clutter free environment maintained   fall prevention program maintained   nonskid shoes/slippers when out of bed   room organization consistent   safety round/check completed  Taken 3/30/2024 0800 by Leigh Ann Oliver, RN  Safety Promotion/Fall Prevention:   activity supervised   assistive device/personal items within reach   clutter free environment maintained   fall prevention program maintained   nonskid shoes/slippers when out of bed   room organization consistent   safety round/check completed                                   Dressing: pressure dressing with telfa

## 2024-03-30 NOTE — CONSULTS
Nephrology  follow up note     Subjective     History of Present Illness:  Patient is a 83 y.o. female  with a previous medical history of HTN, SSS - S/P pacemaker, Depression and GERD who presented to [facility] on 3/26/2024 due to altered mental status and weakness.Patient report  not feeling herself in the last few days.  She said she been taking Ibuprofen for long time that was started by her PCP and she is not sure why. She also started developing rash all over her body for the last 3 days.  She denies any other complaints. Labs shows  Sodium is 135, Potassium 5.6, Creatinine 2.1 (baseline 1.13). Otherwise, labs are unremarkable.  UA shows trace protein.  She is afebrile, all vitals are stable.        Interval Hx:   No acute events overnight   Cr continue to improve to  0.99           History reviewed. No pertinent family history.  Social History     Socioeconomic History    Marital status:    Tobacco Use    Smoking status: Never    Smokeless tobacco: Never   Vaping Use    Vaping status: Never Used   Substance and Sexual Activity    Alcohol use: Never    Drug use: Never    Sexual activity: Defer     Past Medical History:   Diagnosis Date    Essential hypertension 11/26/2022    Gout, unspecified 3/18/2022    Major depressive disorder, single episode, unspecified 3/18/2022    Presence of cardiac pacemaker 3/18/2022    Sick sinus syndrome 3/18/2022     Past Surgical History:   Procedure Laterality Date    CARDIAC ELECTROPHYSIOLOGY PROCEDURE N/A 11/30/2022    Procedure: Biv pacemaker upgradeBiotronik;  Surgeon: Karin Pedersen MD;  Location: [facility] INVASIVE LOCATION;  Service: Cardiovascular;  Laterality: N/A;     Medications Prior to Admission   Medication Sig Dispense Refill Last Dose    allopurinol (ZYLOPRIM) 100 MG tablet Take 1 tablet by mouth Daily.       escitalopram (LEXAPRO) 10 MG tablet Take 1 tablet by mouth Daily.       lisinopril-hydrochlorothiazide (PRINZIDE,ZESTORETIC)  "20-25 MG per tablet Take 1 tablet by mouth Daily.        Patient has no known allergies.    Objective     Review of Systems\"   10 ROS obtained and negative except what mentioned on HPI         Physical Exam:    Constitutional:       Appearance: Normal appearance.   HENT:      Mouth/Throat:      Mouth: Mucous membranes are moist.   Cardiovascular:      Rate and Rhythm: Normal rate and regular rhythm.   Pulmonary:      Effort: Pulmonary effort is normal.      Breath sounds: Normal breath sounds.   Abdominal:      General: Bowel sounds are normal.      Palpations: Abdomen is soft.   Musculoskeletal:         General: Normal range of motion   Skin: Rash noted over her body      Assessment & Plan       # CRISTIAN - likely AIN from Ibuprofen She said she been taking Ibuprofen for long time that was started by her PCP and she is not sure why. She also started developing rash all over her body for the last 3 days   (baseline 1.13).  Back to baseline    # Hyperkalemia in the setting of CRISTIAN   # AMS     Plan:   Hold ibuprofen    Agree with IVF. Continue with a rate of 100 cc/h    kidney US  noted   Pending  UPCR   Solumedrol x1 ordered   Discussed biopsy with the patient    FeNa of 1 % ==> Support internal eitology   K normalized following lokelma   Strict I/O         I discussed with the patient/legal representative the risks and the benefits associated with the proposed procedure(s).  The patient/legal representative has been given the opportunity to ask questions.  Alternatives to the proposed treatment (s) were discussed, including the likely results of no treatment.  The patient/legal representative wishes to proceed.       Bridgette Jordan MD  03/30/24  16:31 EDT        "

## 2024-03-30 NOTE — PROGRESS NOTES
[facility] Medicine Services       Patient Name: Stephanie Sol  : 1940  MRN: [id number]  Primary Care Physician:  Lior Oscar MD  Date of admission: 3/26/2024  Date and Time of Service: 3/26/2024 at 2145        Assessment & Plan       Chief Complaint: altered mental status, weakness     Plan:     Home medications not verified at the time of assessment and plan     CRISTIAN  -Creatinine 2.1 (baseline 1.1 ), monitor  -IV fluids ordered  -Avoid nephrotoxic medication and contrast  -Avoid hypotension     Generalized Weakness  -PT/OT consult  -Fall precautions  -Case management consult for discharge planning     Essential Hypertension  -Controlled  -Monitor BP  -Continue home lisinopril, hydrochlorothiazide     Depression  -Continue Lexapro     GERD  -PPI     Rash  -Patient believes this is from the antibiotics she has been on for an abdominal infection  -Solumedrol x1 ordered  -Monitor for improvement     History of Present Illness            History of Present Illness: Stephanie Sol is a 83 y.o. female with a previous medical history of HTN, SSS, S/P pacemaker, Depression and GERD who presented to [facility] on 3/26/2024 due to altered mental status and weakness. She denies any other complaints.      In the ED, Sodium is 135, Potassium 5.6, Creatinine 2.1 (baseline 1.13). Otherwise, labs are unremarkable.  UA shows trace protein.  She is afebrile, all vitals are stable.  Hospitalist was consulted for further management.     12 point ROS reviewed and negative except as mentioned above      3/27  Patient is pleasantly confused  Feeling weak  Patient  report that patient has been confused as a baseline but and her balance has been extremely compared over last 3 days  Will get an MRI of the brain for further evaluation to evaluate posterior fossa for any cerebellar stroke that could have caused the balance issue  Her creatinine is down to 1.74 from 2.14 and is awaiting nephrology  evaluation  Continue hydration and follow daily creatinine  I tried to order MRI patient had some metallic implant so could not have an MRI    Patient had CT of the brain yesterday with no acute abnormalities  Will get neuroconsult for further recommendation    3/28  Patient seen with RN at bedside  She is pleasantly confused sitting in her chair comfortable  Creatinine is at 1.74 with baseline of 1.1 seen per nephrology  ctof the brain is negative for acute abnormalities with chronic microvascular changes  Patient had MRI of the brain in 2022 showing atrophy  Her  saying that her memory was fine up till recently  I think patient does have underlying history of dementia she is little more depressed due to dementia  She will probably benefit from skilled nursing facility placement  She will need 30 days or less  Check MRI results  Her TSH was normal  Discharge planning skilled nursing facility once bed is available and if cleared per nephrology    3/29  MRI of the cervical spine with severe spinal stenosis and foraminal narrowing  Patient was some neck pain  Will get neurosurgical consult for evaluation  Continue to be pleasantly confused  MRI of the brain otherwise with no acute abnormalities except for moderate atrophy and chronic microvascular disease which correlate with patient underlying dementia    3/30  Patient seen per neurosurgery and is cleared for discharge  No intervention at this point  Patient with a sitter at bedside  Pleasantly confused  Seen with the  in the room  Patient with underlying dementia  Discharge planning skilled nursing facility once bed is available     Objective       Vitals:   Temp:  [100.5 °F (38.1 °C)] 100.5 °F (38.1 °C)  Heart Rate:  [60-75] 65  Resp:  [16-18] 18  BP: (110-149)/(60-85) 123/64  Body mass index is 24.69 kg/m².  Physical Exam  Vitals and nursing note reviewed.   Constitutional:       Appearance: Normal appearance.   HENT:      Mouth/Throat:      Mouth:  Mucous membranes are moist.   Cardiovascular:      Rate and Rhythm: Normal rate and regular rhythm.   Pulmonary:      Effort: Pulmonary effort is normal.      Breath sounds: Normal breath sounds.   Abdominal:      General: Bowel sounds are normal.      Palpations: Abdomen is soft.   Musculoskeletal:         General: Normal range of motion.   Skin:     General: Skin is warm and dry.      Findings: Rash (generalized, over her entire body) present.   Neurological:      General: No focal deficit present.      Mental Status: She is alert and oriented to person, place, and time. Mental status is at baseline.   Psychiatric:         Mood and Affect: Mood normal.         Behavior: Behavior normal.               Personal History      This is a 83 y.o. female with:     Medical History        Past Medical History:   Diagnosis Date    Essential hypertension 11/26/2022    Gout, unspecified 3/18/2022    Major depressive disorder, single episode, unspecified 3/18/2022    Presence of cardiac pacemaker 3/18/2022    Sick sinus syndrome 3/18/2022            Surgical History         Past Surgical History:   Procedure Laterality Date    CARDIAC ELECTROPHYSIOLOGY PROCEDURE N/A 11/30/2022     Procedure: Biv pacemaker upgradeBiotronik;  Surgeon: Karin Pedersen MD;  Location: [facility] CATH INVASIVE LOCATION;  Service: Cardiovascular;  Laterality: N/A;            Active and Resolved Problems  There are no hospital problems to display for this patient.        Family History: family history is not on file. Otherwise pertinent FHx was reviewed and not pertinent to current issue.     Social History:  reports that she has never smoked. She has never used smokeless tobacco. She reports that she does not drink alcohol and does not use drugs.     Home Medications:  Prior to Admission Medications         None                   Allergies:  Allergies   No Known Allergies              DVT prophylaxis:  Mechanical DVT prophylaxis orders are present.            CODE STATUS:       Code Status (Patient has no pulse and is not breathing): CPR (Attempt to Resuscitate)  Medical Interventions (Patient has pulse or is breathing): Full Support           Admission Status:  I believe this patient meets observation status.     I discussed the patient's findings and my recommendations with patient.     Signature:

## 2024-03-31 ENCOUNTER — READMISSION MANAGEMENT (OUTPATIENT)
Dept: CALL CENTER | Facility: HOSPITAL | Age: 84
End: 2024-03-31
Payer: MEDICARE

## 2024-03-31 VITALS
BODY MASS INDEX: 23.12 KG/M2 | HEART RATE: 60 BPM | HEIGHT: 62 IN | OXYGEN SATURATION: 98 % | TEMPERATURE: 97.6 F | RESPIRATION RATE: 13 BRPM | WEIGHT: 125.66 LBS | SYSTOLIC BLOOD PRESSURE: 166 MMHG | DIASTOLIC BLOOD PRESSURE: 62 MMHG

## 2024-03-31 RX ORDER — ATORVASTATIN CALCIUM 40 MG/1
40 TABLET, FILM COATED ORAL NIGHTLY
Qty: 30 TABLET | Refills: 0 | Status: SHIPPED | OUTPATIENT
Start: 2024-03-31

## 2024-03-31 RX ORDER — ASPIRIN 325 MG
325 TABLET ORAL DAILY
Qty: 30 TABLET | Refills: 0 | Status: SHIPPED | OUTPATIENT
Start: 2024-04-01

## 2024-03-31 RX ADMIN — ESCITALOPRAM OXALATE 10 MG: 10 TABLET ORAL at 08:49

## 2024-03-31 RX ADMIN — LISINOPRIL 20 MG: 20 TABLET ORAL at 08:49

## 2024-03-31 RX ADMIN — Medication 10 ML: at 08:54

## 2024-03-31 RX ADMIN — HYDROCHLOROTHIAZIDE 25 MG: 25 TABLET ORAL at 08:49

## 2024-03-31 RX ADMIN — ALLOPURINOL 100 MG: 100 TABLET ORAL at 08:49

## 2024-03-31 RX ADMIN — ASPIRIN 325 MG ORAL TABLET 325 MG: 325 PILL ORAL at 08:49

## 2024-03-31 NOTE — OUTREACH NOTE
Prep Survey      Flowsheet Row Responses   [facility] facility patient discharged from? [facility]   Is LACE score < 7 ? No   Eligibility Not Eligible   What are the reasons patient is not eligible? [facility]   Does the patient have one of the following disease processes/diagnoses(primary or secondary)? Other   Prep survey completed? Yes            Jeaneth HERNANDEZ - Registered Nurse

## 2024-03-31 NOTE — CASE MANAGEMENT/SOCIAL WORK
Case Management Discharge Note      Final Note: [place]         Selected Continued Care - Discharged on 3/31/2024 Admission date: 3/26/2024 - Discharge disposition: Home-Health Care c      Destination Coordination complete.      Service Provider Selected Services Address Phone Fax Patient Preferred    [facility] [address] [place] [address] [phone] --                 Transportation Services  Private: Car    Final Discharge Disposition Code: 03 - skilled nursing facility (SNF)

## 2024-03-31 NOTE — CASE MANAGEMENT/SOCIAL WORK
Continued Stay Note  CAROLANN Daly     Patient Name: Stephanie Sol  MRN: [id number]  Today's Date: 3/31/2024    Admit Date: 3/26/2024    Plan: [facility] accepted.  Bed ready 3/31.  Precert not required.  PASRR approved.   Discharge Plan       Row Name 03/31/24 1500       Plan    Plan Comments Dc orders noted.  verified pt has been sitter free x 24 hours.  spouse to transport.                      Expected Discharge Date and Time       Expected Discharge Date Expected Discharge Time    Mar 31, 2024               Alba Richter RN

## 2024-03-31 NOTE — PLAN OF CARE
Goal Outcome Evaluation:        Problem: Adult Inpatient Plan of Care  Goal: Plan of Care Review  Outcome: Ongoing, Progressing  Goal: Patient-Specific Goal (Individualized)  Outcome: Ongoing, Progressing  Goal: Absence of Hospital-Acquired Illness or Injury  Outcome: Ongoing, Progressing  Intervention: Identify and Manage Fall Risk  Recent Flowsheet Documentation  Taken 3/31/2024 0800 by Leigh Ann Oliver RN  Safety Promotion/Fall Prevention:   activity supervised   assistive device/personal items within reach   clutter free environment maintained   fall prevention program maintained   nonskid shoes/slippers when out of bed   room organization consistent   safety round/check completed  Intervention: Prevent Skin Injury  Recent Flowsheet Documentation  Taken 3/31/2024 0800 by Leigh Ann Oliver RN  Body Position: position changed independently  Skin Protection:   adhesive use limited   incontinence pads utilized   transparent dressing maintained   tubing/devices free from skin contact  Intervention: Prevent and Manage VTE (Venous Thromboembolism) Risk  Recent Flowsheet Documentation  Taken 3/31/2024 0800 by Leigh Ann Oliver RN  Activity Management: back to bed  Intervention: Prevent Infection  Recent Flowsheet Documentation  Taken 3/31/2024 0800 by Leigh Ann Oliver RN  Infection Prevention:   environmental surveillance performed   hand hygiene promoted   rest/sleep promoted   single patient room provided  Goal: Optimal Comfort and Wellbeing  Outcome: Ongoing, Progressing  Intervention: Provide Person-Centered Care  Recent Flowsheet Documentation  Taken 3/31/2024 0800 by Leigh Ann Oliver RN  Trust Relationship/Rapport:   care explained   emotional support provided   empathic listening provided   choices provided   questions encouraged   questions answered   thoughts/feelings acknowledged   reassurance provided  Goal: Readiness for Transition of Care  Outcome: Ongoing, Progressing     Problem: Confusion  Chronic  Goal: Optimal Cognitive Function  Outcome: Ongoing, Progressing  Intervention: Minimize and Manage Confusion  Recent Flowsheet Documentation  Taken 3/31/2024 0800 by Leigh Ann Oliver RN  Sensory Stimulation Regulation: television on  Reorientation Measures:   calendar in view   clock in view  Environmental Support: calm environment promoted  Environment Familiarity/Consistency: daily routine followed  Family/Support System Care:   self-care encouraged   support provided  Self-Care Promotion:   independence encouraged   BADL personal routines maintained   BADL personal objects within reach  Intervention: Minimize Injury Risk and Provide Safety  Recent Flowsheet Documentation  Taken 3/31/2024 0800 by Leigh Ann Oliver RN  Enhanced Safety Measures: bed alarm set     Problem: Confusion Chronic  Goal: Optimal Cognitive Function  Outcome: Ongoing, Progressing  Intervention: Minimize and Manage Confusion  Recent Flowsheet Documentation  Taken 3/31/2024 0800 by Leigh Ann Oliver RN  Sensory Stimulation Regulation: television on  Reorientation Measures:   calendar in view   clock in view  Environmental Support: calm environment promoted  Environment Familiarity/Consistency: daily routine followed  Family/Support System Care:   self-care encouraged   support provided  Self-Care Promotion:   independence encouraged   BADL personal routines maintained   BADL personal objects within reach  Intervention: Minimize Injury Risk and Provide Safety  Recent Flowsheet Documentation  Taken 3/31/2024 0800 by Leigh Ann Oliver RN  Enhanced Safety Measures: bed alarm set     Problem: Skin Injury Risk Increased  Goal: Skin Health and Integrity  Outcome: Ongoing, Progressing  Intervention: Optimize Skin Protection  Recent Flowsheet Documentation  Taken 3/31/2024 0800 by Leigh Ann Oliver RN  Pressure Reduction Techniques:   frequent weight shift encouraged   weight shift assistance provided  Head of Bed (HOB) Positioning: HOB  elevated  Pressure Reduction Devices: pressure-redistributing mattress utilized  Skin Protection:   adhesive use limited   incontinence pads utilized   transparent dressing maintained   tubing/devices free from skin contact     Problem: Fall Injury Risk  Goal: Absence of Fall and Fall-Related Injury  Outcome: Ongoing, Progressing  Intervention: Identify and Manage Contributors  Recent Flowsheet Documentation  Taken 3/31/2024 0800 by Leigh Ann Oliver RN  Medication Review/Management: medications reviewed  Self-Care Promotion:   independence encouraged   BADL personal routines maintained   BADL personal objects within reach  Intervention: Promote Injury-Free Environment  Recent Flowsheet Documentation  Taken 3/31/2024 0800 by Leigh Ann Oliver RN  Safety Promotion/Fall Prevention:   activity supervised   assistive device/personal items within reach   clutter free environment maintained   fall prevention program maintained   nonskid shoes/slippers when out of bed   room organization consistent   safety round/check completed

## 2024-03-31 NOTE — PROGRESS NOTES
[facility] Medicine Services       Patient Name: Stephanie Sol  : 1940  MRN: [id number]  Primary Care Physician:  Lior Oscar MD  Date of admission: 3/26/2024  Date and Time of Service: 3/26/2024 at 2145        Assessment & Plan       Chief Complaint: altered mental status, weakness     Plan:     Home medications not verified at the time of assessment and plan     CRISTIAN  -Creatinine 2.1 (baseline 1.1 ), monitor  -IV fluids ordered  -Avoid nephrotoxic medication and contrast  -Avoid hypotension     Generalized Weakness  -PT/OT consult  -Fall precautions  -Case management consult for discharge planning     Essential Hypertension  -Controlled  -Monitor BP  -Continue home lisinopril, hydrochlorothiazide     Depression  -Continue Lexapro     GERD  -PPI     Rash  -Patient believes this is from the antibiotics she has been on for an abdominal infection  -Solumedrol x1 ordered  -Monitor for improvement     History of Present Illness            History of Present Illness: Stephanie Sol is a 83 y.o. female with a previous medical history of HTN, SSS, S/P pacemaker, Depression and GERD who presented to [facility] on 3/26/2024 due to altered mental status and weakness. She denies any other complaints.      In the ED, Sodium is 135, Potassium 5.6, Creatinine 2.1 (baseline 1.13). Otherwise, labs are unremarkable.  UA shows trace protein.  She is afebrile, all vitals are stable.  Hospitalist was consulted for further management.     12 point ROS reviewed and negative except as mentioned above      3/27  Patient is pleasantly confused  Feeling weak  Patient  report that patient has been confused as a baseline but and her balance has been extremely compared over last 3 days  Will get an MRI of the brain for further evaluation to evaluate posterior fossa for any cerebellar stroke that could have caused the balance issue  Her creatinine is down to 1.74 from 2.14 and is awaiting nephrology  evaluation  Continue hydration and follow daily creatinine  I tried to order MRI patient had some metallic implant so could not have an MRI    Patient had CT of the brain yesterday with no acute abnormalities  Will get neuroconsult for further recommendation    3/28  Patient seen with RN at bedside  She is pleasantly confused sitting in her chair comfortable  Creatinine is at 1.74 with baseline of 1.1 seen per nephrology  ctof the brain is negative for acute abnormalities with chronic microvascular changes  Patient had MRI of the brain in 2022 showing atrophy  Her  saying that her memory was fine up till recently  I think patient does have underlying history of dementia she is little more depressed due to dementia  She will probably benefit from skilled nursing facility placement  She will need 30 days or less  Check MRI results  Her TSH was normal  Discharge planning skilled nursing facility once bed is available and if cleared per nephrology    3/29  MRI of the cervical spine with severe spinal stenosis and foraminal narrowing  Patient was some neck pain  Will get neurosurgical consult for evaluation  Continue to be pleasantly confused  MRI of the brain otherwise with no acute abnormalities except for moderate atrophy and chronic microvascular disease which correlate with patient underlying dementia    3/30  Patient seen per neurosurgery and is cleared for discharge  No intervention at this point  Patient with a sitter at bedside  Pleasantly confused  Seen with the  in the room  Patient with underlying dementia  Discharge planning skilled nursing facility once bed is available still awaiting discharge to skilled nursing facility    3/31  Awaiting discharge to skilled nursing facility   no acute distress pleasantly confused  Patient wonders dementia Alzheimer type  Hemodynamically stable  No change in exam     Objective       Vitals:   Temp:  [100.5 °F (38.1 °C)] 100.5 °F (38.1 °C)  Heart Rate:   [60-75] 65  Resp:  [16-18] 18  BP: (110-149)/(60-85) 123/64  Body mass index is 24.69 kg/m².  Physical Exam  Vitals and nursing note reviewed.   Constitutional:       Appearance: Normal appearance.   HENT:      Mouth/Throat:      Mouth: Mucous membranes are moist.   Cardiovascular:      Rate and Rhythm: Normal rate and regular rhythm.   Pulmonary:      Effort: Pulmonary effort is normal.      Breath sounds: Normal breath sounds.   Abdominal:      General: Bowel sounds are normal.      Palpations: Abdomen is soft.   Musculoskeletal:         General: Normal range of motion.   Skin:     General: Skin is warm and dry.      Findings: Rash (generalized, over her entire body) present.   Neurological:      General: No focal deficit present.      Mental Status: She is alert and oriented to person, place, and time. Mental status is at baseline.   Psychiatric:         Mood and Affect: Mood normal.         Behavior: Behavior normal.               Personal History      This is a 83 y.o. female with:     Medical History        Past Medical History:   Diagnosis Date    Essential hypertension 11/26/2022    Gout, unspecified 3/18/2022    Major depressive disorder, single episode, unspecified 3/18/2022    Presence of cardiac pacemaker 3/18/2022    Sick sinus syndrome 3/18/2022            Surgical History         Past Surgical History:   Procedure Laterality Date    CARDIAC ELECTROPHYSIOLOGY PROCEDURE N/A 11/30/2022     Procedure: Biv pacemaker upgradeBiotronik;  Surgeon: Karin Pedersen MD;  Location: [facility] INVASIVE LOCATION;  Service: Cardiovascular;  Laterality: N/A;            Active and Resolved Problems  There are no hospital problems to display for this patient.        Family History: family history is not on file. Otherwise pertinent FHx was reviewed and not pertinent to current issue.     Social History:  reports that she has never smoked. She has never used smokeless tobacco. She reports that she does not drink  alcohol and does not use drugs.     Home Medications:  Prior to Admission Medications         None                   Allergies:  Allergies   No Known Allergies              DVT prophylaxis:  Mechanical DVT prophylaxis orders are present.           CODE STATUS:       Code Status (Patient has no pulse and is not breathing): CPR (Attempt to Resuscitate)  Medical Interventions (Patient has pulse or is breathing): Full Support           Admission Status:  I believe this patient meets observation status.     I discussed the patient's findings and my recommendations with patient.     Signature:

## 2024-04-01 NOTE — DISCHARGE SUMMARY
[facility]        Patient Name: Stephanie Sol  : 1940  MRN: [id number]  Primary Care Physician:  Lior Oscar MD  Date of admission: 3/26/2024  Date and Time of Service: 3/26/2024 at 2145        Assessment & Plan       Chief Complaint: altered mental status, weakness     Plan:     Home medications not verified at the time of assessment and plan     CRISTIAN  -Creatinine 2.1 (baseline 1.1 ), monitor  -IV fluids ordered  -Avoid nephrotoxic medication and contrast  -Avoid hypotension     Generalized Weakness  -PT/OT consult  -Fall precautions  -Case management consult for discharge planning     Essential Hypertension  -Controlled  -Monitor BP  -Continue home lisinopril, hydrochlorothiazide     Depression  -Continue Lexapro     GERD  -PPI     Rash  -Patient believes this is from the antibiotics she has been on for an abdominal infection  -Solumedrol x1 ordered  -Monitor for improvement     History of Present Illness            History of Present Illness: Stephanie Sol is a 83 y.o. female with a previous medical history of HTN, SSS, S/P pacemaker, Depression and GERD who presented to [facility] on 3/26/2024 due to altered mental status and weakness. She denies any other complaints.      In the ED, Sodium is 135, Potassium 5.6, Creatinine 2.1 (baseline 1.13). Otherwise, labs are unremarkable.  UA shows trace protein.  She is afebrile, all vitals are stable.  Hospitalist was consulted for further management.     12 point ROS reviewed and negative except as mentioned above      3/27  Patient is pleasantly confused  Feeling weak  Patient  report that patient has been confused as a baseline but and her balance has been extremely compared over last 3 days  Will get an MRI of the brain for further evaluation to evaluate posterior fossa for any cerebellar stroke that could have caused the balance issue  Her creatinine is down to 1.74 from 2.14 and is awaiting nephrology  evaluation  Continue hydration and follow daily creatinine  I tried to order MRI patient had some metallic implant so could not have an MRI     Patient had CT of the brain yesterday with no acute abnormalities  Will get neuroconsult for further recommendation     3/28  Patient seen with RN at bedside  She is pleasantly confused sitting in her chair comfortable  Creatinine is at 1.74 with baseline of 1.1 seen per nephrology  ctof the brain is negative for acute abnormalities with chronic microvascular changes  Patient had MRI of the brain in 2022 showing atrophy  Her  saying that her memory was fine up till recently  I think patient does have underlying history of dementia she is little more depressed due to dementia  She will probably benefit from skilled nursing facility placement  She will need 30 days or less  Check MRI results  Her TSH was normal  Discharge planning skilled nursing facility once bed is available and if cleared per nephrology     3/29  MRI of the cervical spine with severe spinal stenosis and foraminal narrowing  Patient was some neck pain  Will get neurosurgical consult for evaluation  Continue to be pleasantly confused  MRI of the brain otherwise with no acute abnormalities except for moderate atrophy and chronic microvascular disease which correlate with patient underlying dementia     3/30  Patient seen per neurosurgery and is cleared for discharge  No intervention at this point  Patient with a sitter at bedside  Pleasantly confused  Seen with the  in the room  Patient with underlying dementia  Discharge planning skilled nursing facility once bed is available still awaiting discharge to skilled nursing facility     3/31  Awaiting discharge to skilled nursing facility   no acute distress pleasantly confused  Patient wonders dementia Alzheimer type  Hemodynamically stable  No change in exam     Objective       Vitals:   Temp:  [100.5 °F (38.1 °C)] 100.5 °F (38.1 °C)  Heart Rate:   [60-75] 65  Resp:  [16-18] 18  BP: (110-149)/(60-85) 123/64  Body mass index is 24.69 kg/m².  Physical Exam  Vitals and nursing note reviewed.   Constitutional:       Appearance: Normal appearance.   HENT:      Mouth/Throat:      Mouth: Mucous membranes are moist.   Cardiovascular:      Rate and Rhythm: Normal rate and regular rhythm.   Pulmonary:      Effort: Pulmonary effort is normal.      Breath sounds: Normal breath sounds.   Abdominal:      General: Bowel sounds are normal.      Palpations: Abdomen is soft.   Musculoskeletal:         General: Normal range of motion.   Skin:     General: Skin is warm and dry.      Findings: Rash (generalized, over her entire body) present.   Neurological:      General: No focal deficit present.      Mental Status: She is alert and oriented to person, place, and time. Mental status is at baseline.   Psychiatric:         Mood and Affect: Mood normal.         Behavior: Behavior normal.               Personal History      This is a 83 y.o. female with:     Medical History           Past Medical History:   Diagnosis Date    Essential hypertension 11/26/2022    Gout, unspecified 3/18/2022    Major depressive disorder, single episode, unspecified 3/18/2022    Presence of cardiac pacemaker 3/18/2022    Sick sinus syndrome 3/18/2022            Surgical History             Past Surgical History:   Procedure Laterality Date    CARDIAC ELECTROPHYSIOLOGY PROCEDURE N/A 11/30/2022     Procedure: Biv pacemaker upgradeBiotronik;  Surgeon: Karin Pedersen MD;  Location: [facility] INVASIVE LOCATION;  Service: Cardiovascular;  Laterality: N/A;            Active and Resolved Problems  There are no hospital problems to display for this patient.        Family History: family history is not on file. Otherwise pertinent FHx was reviewed and not pertinent to current issue.     Social History:  reports that she has never smoked. She has never used smokeless tobacco. She reports that she does not  drink alcohol and does not use drugs.     Home Medications:  Prior to Admission Medications         None                   Allergies:  Allergies   No Known Allergies               DVT prophylaxis:  Mechanical DVT prophylaxis orders are present.           CODE STATUS:       Code Status (Patient has no pulse and is not breathing): CPR (Attempt to Resuscitate)  Medical Interventions (Patient has pulse or is breathing): Full Support           Admission Status:  I believe this patient meets observation status.     I discussed the patient's findings and my recommendations with patient.   time fordc 35 m  Signature:

## 2024-09-10 PROCEDURE — 93294 REM INTERROG EVL PM/LDLS PM: CPT | Performed by: INTERNAL MEDICINE

## 2024-09-10 PROCEDURE — 93296 REM INTERROG EVL PM/IDS: CPT | Performed by: INTERNAL MEDICINE

## (undated) DEVICE — DRAPE,FEM ANGIO,2 WIN,STERILE: Brand: MEDLINE

## (undated) DEVICE — ANTIBACTERIAL UNDYED BRAIDED (POLYGLACTIN 910), SYNTHETIC ABSORBABLE SUTURE: Brand: COATED VICRYL

## (undated) DEVICE — SUT ETHIB 0/0 MO6 I8IN CX45D

## (undated) DEVICE — UNDYED BRAIDED (POLYGLACTIN 910), SYNTHETIC ABSORBABLE SUTURE: Brand: COATED VICRYL

## (undated) DEVICE — INTRO SHEATH PRELUDE SNAP .038 6F 13CM W/SDPRT

## (undated) DEVICE — PLASMABLADE PS200-040 4.0: Brand: PLASMABLADE™

## (undated) DEVICE — 3M™ STERI-STRIP™ REINFORCED ADHESIVE SKIN CLOSURES, R1547, 1/2 IN X 4 IN (12 MM X 100 MM), 6 STRIPS/ENVELOPE: Brand: 3M™ STERI-STRIP™

## (undated) DEVICE — 3M™ IOBAN™ 2 ANTIMICROBIAL INCISE DRAPE 6650EZ: Brand: IOBAN™ 2

## (undated) DEVICE — Device

## (undated) DEVICE — CABL BIPOL W/ALLGTR CLIP/SM 12FT

## (undated) DEVICE — VIOLET BRAIDED (POLYGLACTIN 910), SYNTHETIC ABSORBABLE SUTURE: Brand: COATED VICRYL

## (undated) DEVICE — PACEMAKER CDS: Brand: MEDLINE INDUSTRIES, INC.

## (undated) DEVICE — ST ACC VASC MICROPUNCTURE STFF SS/GW/.018 SS/TP 4F 21G